# Patient Record
Sex: FEMALE | Race: WHITE | Employment: OTHER | URBAN - METROPOLITAN AREA
[De-identification: names, ages, dates, MRNs, and addresses within clinical notes are randomized per-mention and may not be internally consistent; named-entity substitution may affect disease eponyms.]

---

## 2017-11-14 PROBLEM — S42.022A DISPLACED FRACTURE OF SHAFT OF LEFT CLAVICLE, INITIAL ENCOUNTER FOR CLOSED FRACTURE: Status: ACTIVE | Noted: 2017-11-14

## 2018-04-16 ENCOUNTER — HOSPITAL ENCOUNTER (OUTPATIENT)
Dept: GENERAL RADIOLOGY | Age: 63
Discharge: HOME | End: 2018-04-18
Payer: MEDICARE

## 2018-04-16 ENCOUNTER — OFFICE VISIT (OUTPATIENT)
Dept: ORTHOPEDIC SURGERY | Age: 63
End: 2018-04-16
Payer: MEDICARE

## 2018-04-16 VITALS
RESPIRATION RATE: 16 BRPM | BODY MASS INDEX: 28.35 KG/M2 | SYSTOLIC BLOOD PRESSURE: 140 MMHG | HEART RATE: 55 BPM | WEIGHT: 160 LBS | DIASTOLIC BLOOD PRESSURE: 67 MMHG | HEIGHT: 63 IN

## 2018-04-16 DIAGNOSIS — S42.022A DISPLACED FRACTURE OF SHAFT OF LEFT CLAVICLE, INITIAL ENCOUNTER FOR CLOSED FRACTURE: ICD-10-CM

## 2018-04-16 DIAGNOSIS — S42.022A DISPLACED FRACTURE OF SHAFT OF LEFT CLAVICLE, INITIAL ENCOUNTER FOR CLOSED FRACTURE: Primary | ICD-10-CM

## 2018-04-16 PROCEDURE — 3017F COLORECTAL CA SCREEN DOC REV: CPT | Performed by: NURSE PRACTITIONER

## 2018-04-16 PROCEDURE — 3014F SCREEN MAMMO DOC REV: CPT | Performed by: NURSE PRACTITIONER

## 2018-04-16 PROCEDURE — 99212 OFFICE O/P EST SF 10 MIN: CPT | Performed by: NURSE PRACTITIONER

## 2018-04-16 PROCEDURE — G8427 DOCREV CUR MEDS BY ELIG CLIN: HCPCS | Performed by: NURSE PRACTITIONER

## 2018-04-16 PROCEDURE — 99212 OFFICE O/P EST SF 10 MIN: CPT

## 2018-04-16 PROCEDURE — 73000 X-RAY EXAM OF COLLAR BONE: CPT

## 2018-04-16 PROCEDURE — G8417 CALC BMI ABV UP PARAM F/U: HCPCS | Performed by: NURSE PRACTITIONER

## 2018-04-16 PROCEDURE — 1036F TOBACCO NON-USER: CPT | Performed by: NURSE PRACTITIONER

## 2018-08-15 ENCOUNTER — HOSPITAL ENCOUNTER (OUTPATIENT)
Dept: INFUSION THERAPY | Age: 63
Setting detail: INFUSION SERIES
Discharge: HOME | End: 2018-08-15
Payer: MEDICARE

## 2018-08-15 VITALS
HEIGHT: 62 IN | SYSTOLIC BLOOD PRESSURE: 155 MMHG | RESPIRATION RATE: 16 BRPM | BODY MASS INDEX: 29.44 KG/M2 | TEMPERATURE: 98.2 F | HEART RATE: 67 BPM | OXYGEN SATURATION: 99 % | DIASTOLIC BLOOD PRESSURE: 67 MMHG | WEIGHT: 160 LBS

## 2018-08-15 DIAGNOSIS — M81.0 OSTEOPOROSIS, UNSPECIFIED OSTEOPOROSIS TYPE, UNSPECIFIED PATHOLOGICAL FRACTURE PRESENCE: ICD-10-CM

## 2018-08-15 PROCEDURE — 96372 THER/PROPH/DIAG INJ SC/IM: CPT

## 2018-08-15 PROCEDURE — 6360000002 HC RX W HCPCS: Performed by: FAMILY MEDICINE

## 2018-08-15 RX ADMIN — DENOSUMAB 60 MG: 60 INJECTION SUBCUTANEOUS at 14:21

## 2018-08-15 NOTE — PROGRESS NOTES
Patient tolerated prolia injection well. Therapy plan reviewed with patient. Verbalizes understanding. Reviewed AVS with patient, reviewed medication information, verbalizes good knowledge of current plan, and has no signs or symptoms to report at this time. Declines copy of AVS.  Next appointment made and patient instructed on lab draw and procedure for next injection.

## 2019-02-15 ENCOUNTER — HOSPITAL ENCOUNTER (OUTPATIENT)
Dept: INFUSION THERAPY | Age: 64
Setting detail: INFUSION SERIES
Discharge: HOME | End: 2019-02-15
Payer: MEDICARE

## 2019-02-15 VITALS
OXYGEN SATURATION: 100 % | HEART RATE: 67 BPM | WEIGHT: 160 LBS | BODY MASS INDEX: 29.44 KG/M2 | DIASTOLIC BLOOD PRESSURE: 73 MMHG | RESPIRATION RATE: 16 BRPM | SYSTOLIC BLOOD PRESSURE: 166 MMHG | TEMPERATURE: 98 F | HEIGHT: 62 IN

## 2019-02-15 DIAGNOSIS — M81.0 OSTEOPOROSIS, UNSPECIFIED OSTEOPOROSIS TYPE, UNSPECIFIED PATHOLOGICAL FRACTURE PRESENCE: ICD-10-CM

## 2019-02-15 PROCEDURE — 6360000002 HC RX W HCPCS: Performed by: FAMILY MEDICINE

## 2019-02-15 PROCEDURE — 96372 THER/PROPH/DIAG INJ SC/IM: CPT

## 2019-02-15 RX ADMIN — DENOSUMAB 60 MG: 60 INJECTION SUBCUTANEOUS at 13:43

## 2019-02-24 ENCOUNTER — HOSPITAL ENCOUNTER (EMERGENCY)
Age: 64
Discharge: HOME | End: 2019-02-24
Payer: MEDICARE

## 2019-02-24 ENCOUNTER — APPOINTMENT (OUTPATIENT)
Dept: CT IMAGING | Age: 64
End: 2019-02-24
Payer: MEDICARE

## 2019-02-24 VITALS
HEIGHT: 62 IN | SYSTOLIC BLOOD PRESSURE: 176 MMHG | WEIGHT: 160 LBS | HEART RATE: 70 BPM | DIASTOLIC BLOOD PRESSURE: 76 MMHG | RESPIRATION RATE: 16 BRPM | BODY MASS INDEX: 29.44 KG/M2 | OXYGEN SATURATION: 95 % | TEMPERATURE: 98.2 F

## 2019-02-24 DIAGNOSIS — S01.01XA LACERATION OF SCALP, INITIAL ENCOUNTER: Primary | ICD-10-CM

## 2019-02-24 PROCEDURE — 70450 CT HEAD/BRAIN W/O DYE: CPT

## 2019-02-24 PROCEDURE — 12002 RPR S/N/AX/GEN/TRNK2.6-7.5CM: CPT

## 2019-02-24 PROCEDURE — 6370000000 HC RX 637 (ALT 250 FOR IP): Performed by: NURSE PRACTITIONER

## 2019-02-24 PROCEDURE — 90471 IMMUNIZATION ADMIN: CPT | Performed by: NURSE PRACTITIONER

## 2019-02-24 PROCEDURE — 6360000002 HC RX W HCPCS: Performed by: NURSE PRACTITIONER

## 2019-02-24 PROCEDURE — 99283 EMERGENCY DEPT VISIT LOW MDM: CPT

## 2019-02-24 PROCEDURE — 2500000003 HC RX 250 WO HCPCS: Performed by: NURSE PRACTITIONER

## 2019-02-24 PROCEDURE — 90715 TDAP VACCINE 7 YRS/> IM: CPT | Performed by: NURSE PRACTITIONER

## 2019-02-24 RX ORDER — LIDOCAINE HYDROCHLORIDE AND EPINEPHRINE 10; 10 MG/ML; UG/ML
20 INJECTION, SOLUTION INFILTRATION; PERINEURAL ONCE
Status: COMPLETED | OUTPATIENT
Start: 2019-02-24 | End: 2019-02-24

## 2019-02-24 RX ORDER — CEPHALEXIN 500 MG/1
500 CAPSULE ORAL 3 TIMES DAILY
Qty: 21 CAPSULE | Refills: 0 | Status: SHIPPED | OUTPATIENT
Start: 2019-02-24 | End: 2019-03-03

## 2019-02-24 RX ORDER — BACITRACIN ZINC AND POLYMYXIN B SULFATE 500; 1000 [USP'U]/G; [USP'U]/G
OINTMENT TOPICAL
Qty: 30 G | Refills: 0 | Status: SHIPPED | OUTPATIENT
Start: 2019-02-24 | End: 2019-03-03

## 2019-02-24 RX ORDER — GINSENG 100 MG
CAPSULE ORAL ONCE
Status: COMPLETED | OUTPATIENT
Start: 2019-02-24 | End: 2019-02-24

## 2019-02-24 RX ADMIN — LIDOCAINE HYDROCHLORIDE AND EPINEPHRINE 20 ML: 10; 10 INJECTION, SOLUTION INFILTRATION; PERINEURAL at 17:02

## 2019-02-24 RX ADMIN — BACITRACIN: 500 OINTMENT TOPICAL at 17:02

## 2019-02-24 RX ADMIN — TETANUS TOXOID, REDUCED DIPHTHERIA TOXOID AND ACELLULAR PERTUSSIS VACCINE, ADSORBED 0.5 ML: 5; 2.5; 8; 8; 2.5 SUSPENSION INTRAMUSCULAR at 17:00

## 2019-02-24 ASSESSMENT — PAIN SCALES - WONG BAKER: WONGBAKER_NUMERICALRESPONSE: 2

## 2019-02-24 ASSESSMENT — PAIN SCALES - GENERAL
PAINLEVEL_OUTOF10: 5
PAINLEVEL_OUTOF10: 6

## 2019-02-24 ASSESSMENT — PAIN DESCRIPTION - PAIN TYPE: TYPE: ACUTE PAIN

## 2019-02-24 ASSESSMENT — PAIN DESCRIPTION - DESCRIPTORS: DESCRIPTORS: ACHING

## 2019-02-24 ASSESSMENT — PAIN DESCRIPTION - LOCATION: LOCATION: HEAD

## 2019-08-16 ENCOUNTER — HOSPITAL ENCOUNTER (OUTPATIENT)
Age: 64
Discharge: HOME | End: 2019-08-16
Payer: MEDICARE

## 2019-08-16 ENCOUNTER — HOSPITAL ENCOUNTER (OUTPATIENT)
Dept: INFUSION THERAPY | Age: 64
Setting detail: INFUSION SERIES
Discharge: HOME | End: 2019-08-16
Payer: MEDICARE

## 2019-08-16 VITALS
TEMPERATURE: 98.1 F | RESPIRATION RATE: 16 BRPM | WEIGHT: 160 LBS | HEART RATE: 58 BPM | BODY MASS INDEX: 29.26 KG/M2 | DIASTOLIC BLOOD PRESSURE: 72 MMHG | SYSTOLIC BLOOD PRESSURE: 158 MMHG

## 2019-08-16 DIAGNOSIS — M81.0 OSTEOPOROSIS, UNSPECIFIED OSTEOPOROSIS TYPE, UNSPECIFIED PATHOLOGICAL FRACTURE PRESENCE: Primary | ICD-10-CM

## 2019-08-16 LAB
ANION GAP SERPL CALCULATED.3IONS-SCNC: 8 MMOL/L (ref 7–16)
BUN BLDV-MCNC: 11 MG/DL (ref 8–23)
CALCIUM SERPL-MCNC: 10.1 MG/DL (ref 8.6–10.2)
CHLORIDE BLD-SCNC: 102 MMOL/L (ref 98–107)
CO2: 30 MMOL/L (ref 22–29)
CREAT SERPL-MCNC: 0.8 MG/DL (ref 0.5–1)
GFR AFRICAN AMERICAN: >60
GFR NON-AFRICAN AMERICAN: >60 ML/MIN/1.73
GLUCOSE BLD-MCNC: 69 MG/DL (ref 74–99)
POTASSIUM SERPL-SCNC: 5.7 MMOL/L (ref 3.5–5)
SODIUM BLD-SCNC: 140 MMOL/L (ref 132–146)

## 2019-08-16 PROCEDURE — 80048 BASIC METABOLIC PNL TOTAL CA: CPT

## 2019-08-16 PROCEDURE — 36415 COLL VENOUS BLD VENIPUNCTURE: CPT

## 2019-08-16 PROCEDURE — 6360000002 HC RX W HCPCS: Performed by: FAMILY MEDICINE

## 2019-08-16 PROCEDURE — 96372 THER/PROPH/DIAG INJ SC/IM: CPT

## 2019-08-16 RX ADMIN — DENOSUMAB 60 MG: 60 INJECTION SUBCUTANEOUS at 13:52

## 2020-02-18 ENCOUNTER — HOSPITAL ENCOUNTER (OUTPATIENT)
Dept: INFUSION THERAPY | Age: 65
Setting detail: INFUSION SERIES
Discharge: HOME | End: 2020-02-18
Payer: MEDICARE

## 2020-02-18 ENCOUNTER — HOSPITAL ENCOUNTER (OUTPATIENT)
Age: 65
Discharge: HOME | End: 2020-02-18
Payer: MEDICARE

## 2020-02-18 DIAGNOSIS — M81.0 OSTEOPOROSIS, UNSPECIFIED OSTEOPOROSIS TYPE, UNSPECIFIED PATHOLOGICAL FRACTURE PRESENCE: Primary | ICD-10-CM

## 2020-02-18 LAB — CALCIUM SERPL-MCNC: 10.1 MG/DL (ref 8.6–10.2)

## 2020-02-18 PROCEDURE — 82310 ASSAY OF CALCIUM: CPT

## 2020-02-18 PROCEDURE — 36415 COLL VENOUS BLD VENIPUNCTURE: CPT

## 2020-02-18 PROCEDURE — 96372 THER/PROPH/DIAG INJ SC/IM: CPT

## 2020-02-18 PROCEDURE — 6360000002 HC RX W HCPCS: Performed by: FAMILY MEDICINE

## 2020-02-18 RX ADMIN — DENOSUMAB 60 MG: 60 INJECTION SUBCUTANEOUS at 14:54

## 2020-02-22 ENCOUNTER — APPOINTMENT (OUTPATIENT)
Dept: GENERAL RADIOLOGY | Age: 65
End: 2020-02-22
Payer: MEDICARE

## 2020-02-22 ENCOUNTER — HOSPITAL ENCOUNTER (EMERGENCY)
Age: 65
Discharge: HOME | End: 2020-02-23
Attending: EMERGENCY MEDICINE
Payer: MEDICARE

## 2020-02-22 LAB
ACETAMINOPHEN LEVEL: <5 MCG/ML (ref 10–30)
ALBUMIN SERPL-MCNC: 5 G/DL (ref 3.5–5.2)
ALP BLD-CCNC: 85 U/L (ref 35–104)
ALT SERPL-CCNC: 21 U/L (ref 0–32)
AMPHETAMINE SCREEN, URINE: NOT DETECTED
ANION GAP SERPL CALCULATED.3IONS-SCNC: 14 MMOL/L (ref 7–16)
AST SERPL-CCNC: 29 U/L (ref 0–31)
BACTERIA: ABNORMAL /HPF
BARBITURATE SCREEN URINE: NOT DETECTED
BENZODIAZEPINE SCREEN, URINE: NOT DETECTED
BILIRUB SERPL-MCNC: 0.3 MG/DL (ref 0–1.2)
BILIRUBIN URINE: NEGATIVE
BLOOD, URINE: NEGATIVE
BUN BLDV-MCNC: 17 MG/DL (ref 8–23)
CALCIUM SERPL-MCNC: 10.7 MG/DL (ref 8.6–10.2)
CANNABINOID SCREEN URINE: NOT DETECTED
CHLORIDE BLD-SCNC: 102 MMOL/L (ref 98–107)
CLARITY: CLEAR
CO2: 27 MMOL/L (ref 22–29)
COCAINE METABOLITE SCREEN URINE: NOT DETECTED
COLOR: YELLOW
CREAT SERPL-MCNC: 0.9 MG/DL (ref 0.5–1)
ETHANOL: <10 MG/DL (ref 0–0.08)
FENTANYL SCREEN, URINE: NOT DETECTED
GFR AFRICAN AMERICAN: >60
GFR NON-AFRICAN AMERICAN: >60 ML/MIN/1.73
GLUCOSE BLD-MCNC: 102 MG/DL (ref 74–99)
GLUCOSE URINE: NEGATIVE MG/DL
HCT VFR BLD CALC: 44.7 % (ref 34–48)
HEMOGLOBIN: 14.4 G/DL (ref 11.5–15.5)
KETONES, URINE: NEGATIVE MG/DL
LEUKOCYTE ESTERASE, URINE: ABNORMAL
LIPASE: 37 U/L (ref 13–60)
Lab: NORMAL
MCH RBC QN AUTO: 30.3 PG (ref 26–35)
MCHC RBC AUTO-ENTMCNC: 32.2 % (ref 32–34.5)
MCV RBC AUTO: 94.1 FL (ref 80–99.9)
METHADONE SCREEN, URINE: NOT DETECTED
NITRITE, URINE: NEGATIVE
OPIATE SCREEN URINE: NOT DETECTED
OXYCODONE URINE: NOT DETECTED
PDW BLD-RTO: 12.1 FL (ref 11.5–15)
PH UA: 5.5 (ref 5–9)
PHENCYCLIDINE SCREEN URINE: NOT DETECTED
PLATELET # BLD: 239 E9/L (ref 130–450)
PMV BLD AUTO: 10.4 FL (ref 7–12)
POTASSIUM SERPL-SCNC: 4.4 MMOL/L (ref 3.5–5)
PROTEIN UA: NEGATIVE MG/DL
RBC # BLD: 4.75 E12/L (ref 3.5–5.5)
RBC UA: ABNORMAL /HPF (ref 0–2)
SALICYLATE, SERUM: <0.3 MG/DL (ref 0–30)
SODIUM BLD-SCNC: 143 MMOL/L (ref 132–146)
SPECIFIC GRAVITY UA: 1.02 (ref 1–1.03)
TOTAL PROTEIN: 8.1 G/DL (ref 6.4–8.3)
TRICYCLIC ANTIDEPRESSANTS SCREEN SERUM: NEGATIVE NG/ML
TROPONIN: <0.01 NG/ML (ref 0–0.03)
UROBILINOGEN, URINE: 0.2 E.U./DL
WBC # BLD: 7.7 E9/L (ref 4.5–11.5)
WBC UA: ABNORMAL /HPF (ref 0–5)

## 2020-02-22 PROCEDURE — 81001 URINALYSIS AUTO W/SCOPE: CPT

## 2020-02-22 PROCEDURE — 93005 ELECTROCARDIOGRAM TRACING: CPT | Performed by: EMERGENCY MEDICINE

## 2020-02-22 PROCEDURE — 71045 X-RAY EXAM CHEST 1 VIEW: CPT

## 2020-02-22 PROCEDURE — 99285 EMERGENCY DEPT VISIT HI MDM: CPT

## 2020-02-22 PROCEDURE — 85027 COMPLETE CBC AUTOMATED: CPT

## 2020-02-22 PROCEDURE — 36415 COLL VENOUS BLD VENIPUNCTURE: CPT

## 2020-02-22 PROCEDURE — 80307 DRUG TEST PRSMV CHEM ANLYZR: CPT

## 2020-02-22 PROCEDURE — 84484 ASSAY OF TROPONIN QUANT: CPT

## 2020-02-22 PROCEDURE — 80053 COMPREHEN METABOLIC PANEL: CPT

## 2020-02-22 PROCEDURE — 83690 ASSAY OF LIPASE: CPT

## 2020-02-22 PROCEDURE — G0480 DRUG TEST DEF 1-7 CLASSES: HCPCS

## 2020-02-22 ASSESSMENT — ENCOUNTER SYMPTOMS
ALLERGIC/IMMUNOLOGIC NEGATIVE: 1
CHEST TIGHTNESS: 0
EYES NEGATIVE: 1

## 2020-02-23 ENCOUNTER — HOSPITAL ENCOUNTER (INPATIENT)
Age: 65
LOS: 8 days | Discharge: NURSING, REHAB OR LONG-TERM CARE | DRG: 881 | End: 2020-03-03
Attending: EMERGENCY MEDICINE | Admitting: PSYCHIATRY & NEUROLOGY
Payer: MEDICARE

## 2020-02-23 VITALS
SYSTOLIC BLOOD PRESSURE: 136 MMHG | OXYGEN SATURATION: 100 % | RESPIRATION RATE: 17 BRPM | BODY MASS INDEX: 29.44 KG/M2 | HEIGHT: 62 IN | DIASTOLIC BLOOD PRESSURE: 76 MMHG | HEART RATE: 88 BPM | TEMPERATURE: 98.3 F | WEIGHT: 160 LBS

## 2020-02-23 LAB
ACETAMINOPHEN LEVEL: <5 MCG/ML (ref 10–30)
ALBUMIN SERPL-MCNC: 4.7 G/DL (ref 3.5–5.2)
ALP BLD-CCNC: 85 U/L (ref 35–104)
ALT SERPL-CCNC: 18 U/L (ref 0–32)
AMPHETAMINE SCREEN, URINE: NOT DETECTED
ANION GAP SERPL CALCULATED.3IONS-SCNC: 11 MMOL/L (ref 7–16)
AST SERPL-CCNC: 22 U/L (ref 0–31)
BARBITURATE SCREEN URINE: NOT DETECTED
BENZODIAZEPINE SCREEN, URINE: NOT DETECTED
BILIRUB SERPL-MCNC: 0.3 MG/DL (ref 0–1.2)
BUN BLDV-MCNC: 24 MG/DL (ref 8–23)
CALCIUM SERPL-MCNC: 10.2 MG/DL (ref 8.6–10.2)
CANNABINOID SCREEN URINE: NOT DETECTED
CHLORIDE BLD-SCNC: 102 MMOL/L (ref 98–107)
CO2: 27 MMOL/L (ref 22–29)
COCAINE METABOLITE SCREEN URINE: NOT DETECTED
CREAT SERPL-MCNC: 0.9 MG/DL (ref 0.5–1)
EKG ATRIAL RATE: 78 BPM
EKG P AXIS: 67 DEGREES
EKG P-R INTERVAL: 130 MS
EKG Q-T INTERVAL: 378 MS
EKG QRS DURATION: 72 MS
EKG QTC CALCULATION (BAZETT): 430 MS
EKG R AXIS: 57 DEGREES
EKG T AXIS: 60 DEGREES
EKG VENTRICULAR RATE: 78 BPM
ETHANOL: <10 MG/DL (ref 0–0.08)
FENTANYL SCREEN, URINE: NOT DETECTED
GFR AFRICAN AMERICAN: >60
GFR NON-AFRICAN AMERICAN: >60 ML/MIN/1.73
GLUCOSE BLD-MCNC: 86 MG/DL (ref 74–99)
HCT VFR BLD CALC: 41.4 % (ref 34–48)
HEMOGLOBIN: 13.1 G/DL (ref 11.5–15.5)
Lab: NORMAL
MCH RBC QN AUTO: 29.8 PG (ref 26–35)
MCHC RBC AUTO-ENTMCNC: 31.6 % (ref 32–34.5)
MCV RBC AUTO: 94.3 FL (ref 80–99.9)
METHADONE SCREEN, URINE: NOT DETECTED
OPIATE SCREEN URINE: NOT DETECTED
OXYCODONE URINE: NOT DETECTED
PDW BLD-RTO: 12.3 FL (ref 11.5–15)
PHENCYCLIDINE SCREEN URINE: NOT DETECTED
PLATELET # BLD: 204 E9/L (ref 130–450)
PMV BLD AUTO: 10.6 FL (ref 7–12)
POTASSIUM SERPL-SCNC: 3.9 MMOL/L (ref 3.5–5)
RBC # BLD: 4.39 E12/L (ref 3.5–5.5)
SALICYLATE, SERUM: <0.3 MG/DL (ref 0–30)
SODIUM BLD-SCNC: 140 MMOL/L (ref 132–146)
TOTAL PROTEIN: 7.5 G/DL (ref 6.4–8.3)
TRICYCLIC ANTIDEPRESSANTS SCREEN SERUM: NEGATIVE NG/ML
WBC # BLD: 8.8 E9/L (ref 4.5–11.5)

## 2020-02-23 PROCEDURE — G0480 DRUG TEST DEF 1-7 CLASSES: HCPCS

## 2020-02-23 PROCEDURE — 36415 COLL VENOUS BLD VENIPUNCTURE: CPT

## 2020-02-23 PROCEDURE — 80053 COMPREHEN METABOLIC PANEL: CPT

## 2020-02-23 PROCEDURE — 80307 DRUG TEST PRSMV CHEM ANLYZR: CPT

## 2020-02-23 PROCEDURE — 93010 ELECTROCARDIOGRAM REPORT: CPT | Performed by: INTERNAL MEDICINE

## 2020-02-23 PROCEDURE — 99285 EMERGENCY DEPT VISIT HI MDM: CPT

## 2020-02-23 PROCEDURE — 85027 COMPLETE CBC AUTOMATED: CPT
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
## 2020-02-23 NOTE — ED NOTES
Patient arrives to [facility] OF [facility] and cooperative. She is well groomed. Patient denies SI, HI and AVH. She is alert and oriented x3.       Alexandre Calzada RN  02/22/20 2023

## 2020-02-23 NOTE — ED PROVIDER NOTES
examination    This patient has remained hemodynamically stable during their ED course. Diagnosis:  1. Mental health problem        Disposition:  Patient's disposition: Admit to mental health unit - medically cleared for admission  Patient's condition is stable.               Rhonda Archer DO  02/23/20 5557

## 2020-02-23 NOTE — ED NOTES
CALL TO PT'S BROTHER KAROL. LEFT MESSAGE FOR HIM TO CALL BACK.        [facility]  02/22/20 2040
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
## 2020-02-23 NOTE — ED NOTES
Pt resting comfortably, 100% of breakfast tray eaten.  No needs at this time     Benedict Castillo, CINDA  02/23/20 4028

## 2020-02-23 NOTE — ED NOTES
ELIE placed phone call to patient brother Chaitanya Barbosa [phone], per Hannah Franklin he has to call his wife first before pt can return home. Chino to call GELACIO back.      Theodora Sykes, MSW, LSW  02/23/20 1143

## 2020-02-24 PROBLEM — F32.9 MDD (MAJOR DEPRESSIVE DISORDER), SINGLE EPISODE: Status: ACTIVE | Noted: 2020-02-24

## 2020-02-24 PROCEDURE — 1240000000 HC EMOTIONAL WELLNESS R&B

## 2020-02-24 PROCEDURE — 6370000000 HC RX 637 (ALT 250 FOR IP): Performed by: PSYCHIATRY & NEUROLOGY

## 2020-02-24 RX ORDER — TRAZODONE HYDROCHLORIDE 50 MG/1
25 TABLET ORAL NIGHTLY PRN
Status: DISCONTINUED | OUTPATIENT
Start: 2020-02-24 | End: 2020-03-03 | Stop reason: HOSPADM

## 2020-02-24 RX ORDER — MAGNESIUM HYDROXIDE/ALUMINUM HYDROXICE/SIMETHICONE 120; 1200; 1200 MG/30ML; MG/30ML; MG/30ML
30 SUSPENSION ORAL PRN
Status: DISCONTINUED | OUTPATIENT
Start: 2020-02-24 | End: 2020-03-03 | Stop reason: HOSPADM

## 2020-02-24 RX ORDER — HALOPERIDOL 5 MG/ML
3 INJECTION INTRAMUSCULAR EVERY 6 HOURS PRN
Status: DISCONTINUED | OUTPATIENT
Start: 2020-02-24 | End: 2020-03-03 | Stop reason: HOSPADM

## 2020-02-24 RX ORDER — ACETAMINOPHEN 325 MG/1
650 TABLET ORAL EVERY 4 HOURS PRN
Status: DISCONTINUED | OUTPATIENT
Start: 2020-02-24 | End: 2020-03-03 | Stop reason: HOSPADM

## 2020-02-24 RX ORDER — HALOPERIDOL 2 MG/1
3 TABLET ORAL EVERY 6 HOURS PRN
Status: DISCONTINUED | OUTPATIENT
Start: 2020-02-24 | End: 2020-03-03 | Stop reason: HOSPADM

## 2020-02-24 RX ADMIN — ALUMINUM HYDROXIDE, MAGNESIUM HYDROXIDE, AND SIMETHICONE 30 ML: 200; 200; 20 SUSPENSION ORAL at 19:55

## 2020-02-24 ASSESSMENT — PAIN SCALES - GENERAL: PAINLEVEL_OUTOF10: 0

## 2020-02-24 ASSESSMENT — SLEEP AND FATIGUE QUESTIONNAIRES
DO YOU USE A SLEEP AID: NO
AVERAGE NUMBER OF SLEEP HOURS: 11
DO YOU HAVE DIFFICULTY SLEEPING: NO

## 2020-02-24 ASSESSMENT — LIFESTYLE VARIABLES: HISTORY_ALCOHOL_USE: NO
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
## 2020-02-24 NOTE — ED PROVIDER NOTES
trismus  Neck: Supple, full ROM, non tender to palpation in the midline, no stridor, no crepitus, no meningeal signs  Pulmonary: Lungs clear to auscultation bilaterally, no wheezes, rales, or rhonchi. Not in respiratory distress  Cardiovascular:  Regular rate. Regular rhythm. No murmurs, gallops, or rubs. 2+ distal pulses  Chest: no chest wall tenderness  Abdomen: Soft. Non tender. Non distended. +BS. No rebound, guarding, or rigidity. No pulsatile masses appreciated. Musculoskeletal: Moves all extremities x 4. Warm and well perfused, no clubbing, cyanosis, or edema. Capillary refill <3 seconds  Skin: warm and dry. No rashes. Superficial laceration to forearm  Neurologic: GCS 15, CN 2-12 grossly intact, no focal deficits, symmetric strength 5/5 in the upper and lower extremities bilaterally  Psych: Anxious appearing but not homicidal or suicidal no hallucinations    -------------------------------------------------- RESULTS -------------------------------------------------  I have personally reviewed all laboratory and imaging results for this patient. Results are listed below. LABS:  No results found for this visit on 02/23/20. RADIOLOGY:  Interpreted by Radiologist.  No orders to display             ------------------------- NURSING NOTES AND VITALS REVIEWED ---------------------------   The nursing notes within the ED encounter and vital signs as below have been reviewed by myself. BP (!) 162/85   Pulse 79   Temp 97.2 °F (36.2 °C) (Tympanic)   Resp 18   Wt 160 lb (72.6 kg)   SpO2 98%   BMI 29.26 kg/m²   Oxygen Saturation Interpretation: Normal    The patients available past medical records and past encounters were reviewed. ------------------------------ ED COURSE/MEDICAL DECISION MAKING----------------------  Medications - No data to display          Medical Decision Making:    Patient presents because of history of being unable to care for self.   Patient has some history of mental

## 2020-02-24 NOTE — ED NOTES
Patient has 2 bags of belongings in the cabinet in room [address] [facility], RN  02/23/20 0089

## 2020-02-24 NOTE — ED NOTES
discussed pt case with Amanda Blevins NP awaiting call back and decision form Dr Casper Edwards at s time.      Russell Alvarado RN  02/24/20 0356

## 2020-02-24 NOTE — ED NOTES
Pt ambulates to bathroom with no assistance, maintained steady gait, tolerated well.       Sandra Echeverria RN  02/24/20 3058

## 2020-02-25 PROBLEM — F39 MOOD DISORDER (HCC): Status: ACTIVE | Noted: 2020-02-25

## 2020-02-25 PROBLEM — F29 PSYCHOSIS (HCC): Status: ACTIVE | Noted: 2020-02-25

## 2020-02-25 PROBLEM — F79 INTELLECTUAL DISABILITY: Status: ACTIVE | Noted: 2020-02-25

## 2020-02-25 PROCEDURE — 1240000000 HC EMOTIONAL WELLNESS R&B

## 2020-02-25 PROCEDURE — 6370000000 HC RX 637 (ALT 250 FOR IP): Performed by: PSYCHIATRY & NEUROLOGY

## 2020-02-25 PROCEDURE — 6370000000 HC RX 637 (ALT 250 FOR IP): Performed by: NURSE PRACTITIONER

## 2020-02-25 PROCEDURE — 99222 1ST HOSP IP/OBS MODERATE 55: CPT | Performed by: NURSE PRACTITIONER

## 2020-02-25 RX ORDER — OXCARBAZEPINE 150 MG/1
150 TABLET, FILM COATED ORAL 2 TIMES DAILY
Status: DISCONTINUED | OUTPATIENT
Start: 2020-02-25 | End: 2020-03-03 | Stop reason: HOSPADM

## 2020-02-25 RX ORDER — OLANZAPINE 5 MG/1
5 TABLET ORAL NIGHTLY
Status: DISCONTINUED | OUTPATIENT
Start: 2020-02-25 | End: 2020-03-03 | Stop reason: HOSPADM

## 2020-02-25 RX ORDER — KETOCONAZOLE 20 MG/ML
SHAMPOO TOPICAL DAILY
COMMUNITY

## 2020-02-25 RX ORDER — DULOXETIN HYDROCHLORIDE 60 MG/1
60 CAPSULE, DELAYED RELEASE ORAL DAILY
Status: ON HOLD | COMMUNITY
End: 2020-03-03 | Stop reason: HOSPADM

## 2020-02-25 RX ORDER — CHLORAL HYDRATE 500 MG
1000 CAPSULE ORAL 3 TIMES DAILY
COMMUNITY

## 2020-02-25 RX ADMIN — OXCARBAZEPINE 150 MG: 150 TABLET, FILM COATED ORAL at 20:13

## 2020-02-25 RX ADMIN — OLANZAPINE 5 MG: 5 TABLET, FILM COATED ORAL at 20:13

## 2020-02-25 RX ADMIN — OXCARBAZEPINE 150 MG: 150 TABLET, FILM COATED ORAL at 14:45

## 2020-02-25 RX ADMIN — TRAZODONE HYDROCHLORIDE 25 MG: 50 TABLET ORAL at 20:13

## 2020-02-25 ASSESSMENT — LIFESTYLE VARIABLES: HISTORY_ALCOHOL_USE: NO

## 2020-02-25 ASSESSMENT — PAIN SCALES - GENERAL: PAINLEVEL_OUTOF10: 0

## 2020-02-25 ASSESSMENT — SLEEP AND FATIGUE QUESTIONNAIRES
DO YOU USE A SLEEP AID: NO
AVERAGE NUMBER OF SLEEP HOURS: 11
DO YOU HAVE DIFFICULTY SLEEPING: NO
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
## 2020-02-25 NOTE — PROGRESS NOTES
No  Insight and Judgment: Poor Judgment, Poor Insight  Present Suicidal Ideation: No  Present Homicidal Ideation: No    Tobacco Screening:  Practical Counseling, on admission, kelly X, if applicable and completed (first 3 are required if patient doesn't refuse):            ( )  Recognizing danger situations (included triggers and roadblocks)                    ( )  Coping skills (new ways to manage stress, exercise, relaxation techniques, changing routine, distraction)                                                           ( )  Basic information about quitting (benefits of quitting, techniques in how to quit, available resources  ( ) [facility]                                           ( ) Patient refused counseling  (x ) Patient has not smoked in the last 30 days    Metabolic Screening:    No results found for: LABA1C    No results found for: CHOL  No results found for: TRIG  No results found for: HDL  No components found for: LDLCAL  No results found for: LABVLDL      Body mass index is 29.47 kg/m². BP Readings from Last 2 Encounters:   02/24/20 (!) 149/67   02/23/20 136/76           Pt admitted with followings belongings:  Dentures: Uppers  Vision - Corrective Lenses: None  Hearing Aid: None  Jewelry: Earrings(white dianne studs)  Body Piercings Removed: N/A  Were All Patient Medications Collected?: Not Applicable     Valuables sent home with no one. Valuables placed in safe in security envelope, number:  . Patient's home medications were none brought in  Patient oriented to surroundings and program expectations and copy of patient rights given. Received admission packet:  yes. Consents reviewed, signed yes. Refused no. Patient verbalize understanding:  yes.     Patient education on precautions: yes                  Bakari Guardado RN

## 2020-02-25 NOTE — PLAN OF CARE
Problem: Anger Management/Homicidal Ideation:  Goal: Ability to verbalize frustrations and anger appropriately will improve  Description  Ability to verbalize frustrations and anger appropriately will improve  Outcome: Ongoing     Problem: Anger Management/Homicidal Ideation:  Goal: Absence of angry outbursts  Description  Absence of angry outbursts  Outcome: Ongoing

## 2020-02-25 NOTE — PROGRESS NOTES
Number of participants: 13  Type of group: Recreational  Mode of intervention: Socialization, Exploration and Activity  Topic: Pet Therapy

## 2020-02-25 NOTE — GROUP NOTE
Group Therapy Note    Date: 2/25/2020    Group Start Time: 1120  Group End Time: 1200  Group Topic: Psychotherapy    [facility]  ALEXIA Richter LSW        Group Therapy Note    Attendees: 7         Patient's Goal:   IMPROVING RELATIONSHIPS WITH OTHERS    Notes:  Patient noted her family has been mean to her. Status After Intervention:  Improved    Participation Level:  Active Listener and Interactive    Participation Quality: Appropriate, Attentive and Sharing      Speech:  normal      Thought Process/Content: Logical      Affective Functioning: Congruent      Mood: euthymic      Level of consciousness:  Alert, Oriented x4 and Attentive      Response to Learning: Able to verbalize current knowledge/experience, Able to verbalize/acknowledge new learning, Able to retain information, Capable of insight, Able to change behavior and Progressing to goal      Endings: None Reported    Modes of Intervention: Education, Support and Socialization      Discipline Responsible: /Counselor      Signature:  Richrd Mortimer, MSW, JONATHAN

## 2020-02-25 NOTE — PLAN OF CARE
Problem: Anger Management/Homicidal Ideation:  Goal: Absence of angry outbursts  Description  Absence of angry outbursts  2/25/2020 0514 by Harpal Mckenna RN  Outcome: Met This Shift     Problem: Falls - Risk of:  Goal: Absence of physical injury  Description  Absence of physical injury  2/25/2020 0514 by Harpal Mckenna RN  Outcome: Met This Shift     Problem: Anger Management/Homicidal Ideation:  Goal: Ability to verbalize frustrations and anger appropriately will improve  Description  Ability to verbalize frustrations and anger appropriately will improve  2/25/2020 1339 by Brain Borrero RN  Outcome: Ongoing  2/25/2020 0925 by Brain Borrero RN  Outcome: Ongoing     Problem: Falls - Risk of:  Goal: Will remain free from falls  Description  Will remain free from falls  2/25/2020 1339 by Brain Borrero RN  Outcome: Ongoing  2/25/2020 0925 by Brain Borrero RN  Outcome: Ongoing  2/25/2020 0514 by Harpal Mckenna RN  Outcome: Met This Kaela Levin 87 denies any SI, HI, but states she hears her brothers voices. I am not sure if she understands the question as she states her brother is retired and lives in the same house. When repeated the question several times patient is on another topic and can never complete the answer. Patient could not answer any more questions as she continually went to different topics and distracted by her room mate. Groups are offered and encouraged. Will continue to monitor.

## 2020-02-25 NOTE — GROUP NOTE
Group Therapy Note    Date: 2/25/2020    Group Start Time: 0115  Group End Time: 0200  Group Topic: Cognitive Skills    [facility]  [id number] [place] [address]        Group Therapy Note    Attendees: 10         Patient's Goal:To participate and acknowledge new information given on topic of healthy boundaries. Notes:  Pt was an active listener and had monopolized the conversation at times. Pt cooperative when redirected. Status After Intervention:  Improved    Participation Level:  Active Listener    Participation Quality: Appropriate, Attentive and Sharing      Speech:  loud      Thought Process/Content: Logical      Affective Functioning: Congruent      Mood: euthymic      Level of consciousness:  Alert, Oriented x4 and Attentive      Response to Learning: Able to verbalize current knowledge/experience, Able to verbalize/acknowledge new learning, Able to retain information and Capable of insight      Endings: None Reported    Modes of Intervention: Education, Support, Socialization and Exploration      Discipline Responsible: /Counselor      Signature:  Lindsey Funk

## 2020-02-25 NOTE — CARE COORDINATION
Fernando Ramon, [facility] office, [phone] cell contacted Navigator to report that he is assisting client's SSA, who is new with discharge planning and coordiantion of care. He reports that they may have a temporary placement for the client at [facility].  Client has been having issues at home since fall, reportedly after her psychiatrist has made medication changes. Laney Muhammad is requesting a team meeting with client, SW, her SSA,  and her potential future providers. He reports that she is her own Guardian. He requests that she sign a release for [facility] to be able to contact jose guadalupe chapman and her psychiatrist.    Obtained signature for MARVA for [facility] ([facility] She does not know who her psychiatrist is. Chart indicates that she treats with dr. Jeffie Castleman. She reports that she does not want to return home and is agreeable to working with [facility]. Faxed MARVA to [facility].    Electronically signed by Savana Peterson on 2/25/2020 at 11:01 AM
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
## 2020-02-26 PROCEDURE — 99232 SBSQ HOSP IP/OBS MODERATE 35: CPT | Performed by: NURSE PRACTITIONER

## 2020-02-26 PROCEDURE — 1240000000 HC EMOTIONAL WELLNESS R&B

## 2020-02-26 PROCEDURE — 6370000000 HC RX 637 (ALT 250 FOR IP): Performed by: NURSE PRACTITIONER

## 2020-02-26 PROCEDURE — 6370000000 HC RX 637 (ALT 250 FOR IP): Performed by: PSYCHIATRY & NEUROLOGY

## 2020-02-26 RX ADMIN — TRAZODONE HYDROCHLORIDE 25 MG: 50 TABLET ORAL at 20:13

## 2020-02-26 RX ADMIN — OLANZAPINE 5 MG: 5 TABLET, FILM COATED ORAL at 20:14

## 2020-02-26 RX ADMIN — OXCARBAZEPINE 150 MG: 150 TABLET, FILM COATED ORAL at 08:51

## 2020-02-26 RX ADMIN — OXCARBAZEPINE 150 MG: 150 TABLET, FILM COATED ORAL at 20:13

## 2020-02-26 ASSESSMENT — PAIN SCALES - GENERAL
PAINLEVEL_OUTOF10: 0
PAINLEVEL_OUTOF10: 0

## 2020-02-26 NOTE — PROGRESS NOTES
Type of Group: Psychotherapy    Wellness Binder Information  Module Name:    Session Number:     Patient's Goal:  Gain insight into interpersonal relationships by interacting  with others. Notes: Pt was able to express strong feelingngs regarding coping with difficult people .  Pt was given support and feed back,    Status After Intervention:  Improved    Participation Level: Interactive    Participation Quality: Attentive and Sharing      Speech: Normal      Thought Process/Content: Logical      Affective Functioning: Congruent      Mood: depressed      Level of consciousness:  Attentive      Response to Learning: Able to verbalize/acknowledge new learning      Endings: None Reported    Modes of Intervention: Support and Socialization      Discipline Responsible: /Counselor      Signature:  TIARA Pereira

## 2020-02-26 NOTE — PROGRESS NOTES
Out in  thoughts concrete at times childlike coloring pictures speech loud pressured but cooperative support given

## 2020-02-26 NOTE — GROUP NOTE
Group Therapy Note    Date: 2/26/2020    Group Start Time: 1000  Group End Time: 7935  Group Topic: Psychoeducation    [facility]    Maricarmen Moncada, CTRS        Group Therapy Note      Number of participants: 6  Type of group: Psychoeducation  Mode of intervention: Education, Support, Socialization, Exploration, Clarifying, and Problem-solving  Topic: Reminiscence Therapy: Childhood pets   Objective: Pt will initiate conversations with peers across group sharing childhood memories of pets. Patient's Goal:  \"Continue to be happy\"     Notes:  Pt was interactive during group sharing childhood memories of pets. Pt met objective by initiating conversation with peers across group. At times can be childlike but is pleasant and cooperative. Status After Intervention:  Improved    Participation Level:  Active Listener and Interactive    Participation Quality: Appropriate, Attentive, Sharing and Supportive      Speech:  normal and pressured      Thought Process/Content: Logical      Affective Functioning: Congruent      Mood: euthymic      Level of consciousness:  Alert, Oriented x4 and Attentive      Response to Learning: Able to verbalize current knowledge/experience, Able to verbalize/acknowledge new learning, Able to retain information, Capable of insight, Able to change behavior and Progressing to goal      Endings: None Reported    Modes of Intervention: Education, Support, Socialization, Exploration, Clarifying and Problem-solving

## 2020-02-26 NOTE — GROUP NOTE
Group Therapy Note    Date: 2/25/2020    Group Start Time: 2015  Group End Time: 2030  Group Topic: Healthy Living/Wellness    [facility]  Elicia De Jesus, RN        Group Therapy Note    Attendees: 11/22         Signature:  Christi Garcias RN

## 2020-02-26 NOTE — GROUP NOTE
Group Therapy Note    Date: 2/25/2020    Group Start Time: 2000  Group End Time: 2015  Group Topic: Wrap-Up    [facility] Savana Cantu, RN        Group Therapy Note    Attendees: 10/22       Signature:  Brennon Rand RN

## 2020-02-26 NOTE — PROGRESS NOTES
3:00-3:30    Pt attended leisure group. Enjoyed putting together 3D puzzle. Pt was 1 out of 4 in attendance.
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
## 2020-02-26 NOTE — PLAN OF CARE
Problem: Anger Management/Homicidal Ideation:  Goal: Absence of angry outbursts  Description  Absence of angry outbursts  Outcome: Met This Shift     Problem: Falls - Risk of:  Goal: Will remain free from falls  Description  Will remain free from falls  4/97/7450 [id number] by Papo Rojas RN  Outcome: Met This Shift     Problem: Falls - Risk of:  Goal: Absence of physical injury  Description  Absence of physical injury  Outcome: Met This Shift     Pt denies SI/HI/AV hallucinations. Pt is bright and pleasant. Denies any depression or anxiety. Friendly and social with peers. Cooperative with care. Will continue to monitor and offer support.

## 2020-02-27 PROCEDURE — 1240000000 HC EMOTIONAL WELLNESS R&B

## 2020-02-27 PROCEDURE — 99232 SBSQ HOSP IP/OBS MODERATE 35: CPT | Performed by: NURSE PRACTITIONER

## 2020-02-27 PROCEDURE — 6370000000 HC RX 637 (ALT 250 FOR IP): Performed by: PSYCHIATRY & NEUROLOGY

## 2020-02-27 PROCEDURE — 6370000000 HC RX 637 (ALT 250 FOR IP): Performed by: NURSE PRACTITIONER

## 2020-02-27 RX ADMIN — OXCARBAZEPINE 150 MG: 150 TABLET, FILM COATED ORAL at 20:11

## 2020-02-27 RX ADMIN — OXCARBAZEPINE 150 MG: 150 TABLET, FILM COATED ORAL at 09:36

## 2020-02-27 RX ADMIN — OLANZAPINE 5 MG: 5 TABLET, FILM COATED ORAL at 20:11

## 2020-02-27 RX ADMIN — TRAZODONE HYDROCHLORIDE 25 MG: 50 TABLET ORAL at 20:11

## 2020-02-27 ASSESSMENT — PAIN SCALES - GENERAL: PAINLEVEL_OUTOF10: 0

## 2020-02-27 NOTE — GROUP NOTE
Group Therapy Note    Date: 2/27/2020    Group Start Time: 1100  Group End Time: 1130  Group Topic: Healthy Living/Wellness    [facility]  [facility]        Group Therapy Note    Attendees:8/13             Signature:  Chuy Painting

## 2020-02-27 NOTE — PROGRESS NOTES
3:00- 4:00 pm  Attended afternoon leisure activity. Pleasant and social with peers. Was 1 of 7 in attendance.
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
## 2020-02-28 PROCEDURE — 6370000000 HC RX 637 (ALT 250 FOR IP): Performed by: PSYCHIATRY & NEUROLOGY

## 2020-02-28 PROCEDURE — 1240000000 HC EMOTIONAL WELLNESS R&B

## 2020-02-28 PROCEDURE — 99232 SBSQ HOSP IP/OBS MODERATE 35: CPT | Performed by: NURSE PRACTITIONER

## 2020-02-28 PROCEDURE — 6370000000 HC RX 637 (ALT 250 FOR IP): Performed by: NURSE PRACTITIONER

## 2020-02-28 RX ADMIN — OXCARBAZEPINE 150 MG: 150 TABLET, FILM COATED ORAL at 08:43

## 2020-02-28 RX ADMIN — OXCARBAZEPINE 150 MG: 150 TABLET, FILM COATED ORAL at 21:17

## 2020-02-28 RX ADMIN — OLANZAPINE 5 MG: 5 TABLET, FILM COATED ORAL at 21:17

## 2020-02-28 RX ADMIN — TRAZODONE HYDROCHLORIDE 25 MG: 50 TABLET ORAL at 21:17

## 2020-02-28 ASSESSMENT — PAIN SCALES - GENERAL: PAINLEVEL_OUTOF10: 0
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
## 2020-02-28 NOTE — PROGRESS NOTES
Never Smoker    Smokeless tobacco: Never Used   Substance and Sexual Activity    Alcohol use: Not Currently     Alcohol/week: 0.0 standard drinks    Drug use: No    Sexual activity: Never   Lifestyle    Physical activity:     Days per week: Not on file     Minutes per session: Not on file    Stress: Not on file   Relationships    Social connections:     Talks on phone: Not on file     Gets together: Not on file     Attends Nondenominational service: Not on file     Active member of club or organization: Not on file     Attends meetings of clubs or organizations: Not on file     Relationship status: Not on file    Intimate partner violence:     Fear of current or ex partner: Not on file     Emotionally abused: Not on file     Physically abused: Not on file     Forced sexual activity: Not on file   Other Topics Concern    Not on file   Social History Narrative    Not on file           ROS:  [x] All negative/unchanged except if checked.  Explain positive(checked items) below:  [] Constitutional  [] Eyes  [] Ear/Nose/Mouth/Throat  [] Respiratory  [] CV  [] GI  []   [] Musculoskeletal  [] Skin/Breast  [] Neurological  [] Endocrine  [] Heme/Lymph  [] Allergic/Immunologic    Explanation:     MEDICATIONS:    Current Facility-Administered Medications:     OLANZapine (ZYPREXA) tablet 5 mg, 5 mg, Oral, Nightly, Jarad Memos Dellick, APRN - CNP, 5 mg at 02/27/20 2011    OXcarbazepine (TRILEPTAL) tablet 150 mg, 150 mg, Oral, BID, Jarad Memos Dellick, APRN - CNP, 099 mg at 02/28/20 0843    acetaminophen (TYLENOL) tablet 650 mg, 650 mg, Oral, Q4H PRN, Jesus Manuel Fenton MD    haloperidol lactate (HALDOL) injection 3 mg, 3 mg, Intramuscular, Q6H PRN **OR** haloperidol (HALDOL) tablet 3 mg, 3 mg, Oral, Q6H PRN, Jesus Manuel Fenton MD    traZODone (DESYREL) tablet 25 mg, 25 mg, Oral, Nightly PRN, Jesus Manuel Fenton MD, 25 mg at 02/27/20 2011    magnesium hydroxide (MILK OF MAGNESIA) 400 MG/5ML suspension 30 mL, 30 mL, Oral, Daily PRN,
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
## 2020-02-28 NOTE — CARE COORDINATION
Tameka Skinner  The case manger agreed to meeting  02/28/19 @ 2:30. Tameka Skinner reports a representative from [facility] will meet with pt  at this time and explain the rules and program to  Patient and answer any questions that pt may have.

## 2020-02-29 PROCEDURE — 99232 SBSQ HOSP IP/OBS MODERATE 35: CPT | Performed by: NURSE PRACTITIONER

## 2020-02-29 PROCEDURE — 1240000000 HC EMOTIONAL WELLNESS R&B

## 2020-02-29 PROCEDURE — 6370000000 HC RX 637 (ALT 250 FOR IP): Performed by: NURSE PRACTITIONER

## 2020-02-29 PROCEDURE — 6370000000 HC RX 637 (ALT 250 FOR IP): Performed by: PSYCHIATRY & NEUROLOGY

## 2020-02-29 RX ADMIN — TRAZODONE HYDROCHLORIDE 25 MG: 50 TABLET ORAL at 21:04

## 2020-02-29 RX ADMIN — OXCARBAZEPINE 150 MG: 150 TABLET, FILM COATED ORAL at 09:04

## 2020-02-29 RX ADMIN — OXCARBAZEPINE 150 MG: 150 TABLET, FILM COATED ORAL at 21:04

## 2020-02-29 RX ADMIN — OLANZAPINE 5 MG: 5 TABLET, FILM COATED ORAL at 21:04

## 2020-02-29 RX ADMIN — ACETAMINOPHEN 650 MG: 325 TABLET ORAL at 21:03

## 2020-02-29 ASSESSMENT — PAIN SCALES - GENERAL
PAINLEVEL_OUTOF10: 8
PAINLEVEL_OUTOF10: 0

## 2020-02-29 NOTE — PROGRESS NOTES
BEHAVIORAL HEALTH FOLLOW-UP NOTE     2/29/2020     Patient was seen and examined in person, Chart reviewed   Patient's case discussed with staff/team    Chief Complaint: Calm pleasant and cooperative    Interim History: Met with the patient while on the unit today the patient is very social with others. The patient denies all symptoms of suicide, homicide, audible and visual hallucinations. The patient is very pleasant, cooperative. She is slightly intrusive with poor boundaries due to her mental disability. The patient is taking her medications as prescribed and has not been an emotional outburst on the unit. The patient states that she feels sorrow for fighting with her brother.       Appetite:   [x] Normal/Unchanged  [] Increased  [] Decreased      Sleep:       [x] Normal/Unchanged  [] Fair       [] Poor              Energy:    [x] Normal/Unchanged  [] Increased  [] Decreased        SI [] Present  [x] Absent    HI  []Present  [x] Absent     Aggression:  [] yes  [x] no    Patient is [x] able  [] unable to CONTRACT FOR SAFETY     PAST MEDICAL/PSYCHIATRIC HISTORY:   Past Medical History:   Diagnosis Date    Anemia     Clavicle fracture     Helicobacter pylori infection 12/15    Mental disability     Osteopenia     gets yearly injections    Pneumonia     Thyroid disease     Varicose veins        FAMILY/SOCIAL HISTORY:  Family History   Problem Relation Age of Onset    Diabetes Mother     Heart Failure Mother      Social History     Socioeconomic History    Marital status: Single     Spouse name: Not on file    Number of children: 0    Years of education: 15    Highest education level: Not on file   Occupational History    Not on file   Social Needs    Financial resource strain: Not on file    Food insecurity:     Worry: Not on file     Inability: Not on file    Transportation needs:     Medical: Not on file     Non-medical: Not on file   Tobacco Use    Smoking status: Never Smoker    Smokeless tobacco: Never Used   Substance and Sexual Activity    Alcohol use: Not Currently     Alcohol/week: 0.0 standard drinks    Drug use: No    Sexual activity: Never   Lifestyle    Physical activity:     Days per week: Not on file     Minutes per session: Not on file    Stress: Not on file   Relationships    Social connections:     Talks on phone: Not on file     Gets together: Not on file     Attends Sabianist service: Not on file     Active member of club or organization: Not on file     Attends meetings of clubs or organizations: Not on file     Relationship status: Not on file    Intimate partner violence:     Fear of current or ex partner: Not on file     Emotionally abused: Not on file     Physically abused: Not on file     Forced sexual activity: Not on file   Other Topics Concern    Not on file   Social History Narrative    Not on file           ROS:  [x] All negative/unchanged except if checked.  Explain positive(checked items) below:  [] Constitutional  [] Eyes  [] Ear/Nose/Mouth/Throat  [] Respiratory  [] CV  [] GI  []   [] Musculoskeletal  [] Skin/Breast  [] Neurological  [] Endocrine  [] Heme/Lymph  [] Allergic/Immunologic    Explanation:     MEDICATIONS:    Current Facility-Administered Medications:     OLANZapine (ZYPREXA) tablet 5 mg, 5 mg, Oral, Nightly, Carlie Chacon, APRN - CNP, 5 mg at 02/28/20 2117    OXcarbazepine (TRILEPTAL) tablet 150 mg, 150 mg, Oral, BID, Carlie Chacon, APRN - CNP, 424 mg at 02/29/20 0904    acetaminophen (TYLENOL) tablet 650 mg, 650 mg, Oral, Q4H PRN, Keiko Ng MD    haloperidol lactate (HALDOL) injection 3 mg, 3 mg, Intramuscular, Q6H PRN **OR** haloperidol (HALDOL) tablet 3 mg, 3 mg, Oral, Q6H PRN, Keiko Ng MD    traZODone (DESYREL) tablet 25 mg, 25 mg, Oral, Nightly PRN, Keiko Ng MD, 25 mg at 02/28/20 2117    magnesium hydroxide (MILK OF MAGNESIA) 400 MG/5ML suspension 30 mL, 30 mL, Oral, Daily PRN, Keiko Ng MD    aluminum & magnesium hydroxide-simethicone (MAALOX) 200-200-20 MG/5ML suspension 30 mL, 30 mL, Oral, PRN, Emma Solares MD, 30 mL at 02/1955      Examination:  BP (!) 119/57   Pulse 56   Temp 97.9 °F (36.6 °C) (Temporal)   Resp 12   Ht 5' 2\" (1.575 m)   Wt 161 lb 2 oz (73.1 kg)   SpO2 100%   BMI 29.47 kg/m²   Gait - steady  Medication side effects(SE): Medication side effects were explained to the patient patient stated understanding. Patient exhibits no side effects or complications from currently prescribed medications. Mental Status Examination:    Level of consciousness:  within normal limits   Appearance:  good grooming and good hygiene  Behavior/Motor:  no abnormalities noted  Attitude toward examiner:  cooperative  Speech: Normal rate rhythm volume and tone  Mood: euthymic  Affect:  mood congruent  Thought processes:  coherent   Thought content: Without hallucinations delusions or paranoia  Cognition:  oriented to person, place, and time but the patient is deemed MR  Concentration intact  Insight fair   Judgement fair     ASSESSMENT:   Patient symptoms are:  [] Well controlled  [x] Improving  [] Worsening  [] No change      Diagnosis:   Active Problems:    Psychosis NOS ([facility] [address]    Intellectual disability    Mood disorder ([facility] [address]  Resolved Problems:    * No resolved hospital problems. *      LABS:    No results for input(s): WBC, HGB, PLT in the last 72 hours. No results for input(s): NA, K, CL, CO2, BUN, CREATININE, GLUCOSE in the last 72 hours. No results for input(s): BILITOT, ALKPHOS, AST, ALT in the last 72 hours.   Lab Results   Component Value Date    LABAMPH NOT DETECTED 02/23/2020    BARBSCNU NOT DETECTED 02/23/2020    LABBENZ NOT DETECTED 02/23/2020    LABMETH NOT DETECTED 02/23/2020    OPIATESCREENURINE NOT DETECTED 02/23/2020    PHENCYCLIDINESCREENURINE NOT DETECTED 02/23/2020    ETOH <10 02/23/2020     No results found for: TSH, FREET4  No results found for: LITHIUM  No

## 2020-02-29 NOTE — PLAN OF CARE
Problem: Anger Management/Homicidal Ideation:  Goal: Ability to verbalize frustrations and anger appropriately will improve  Description  Ability to verbalize frustrations and anger appropriately will improve  2/28/2020 2051 by Brijesh Sifuentes RN  Outcome: Ongoing     Patient out on unit. Social with peers. Reports having a good day and a \"good meeting\". Reports an understanding of keeping anger in control and \"not hitting\" anymore. Patient presents with a bright affect. Poor boundaries. Calm and cooperative during conversation. Denies SI, HI, and hallucinations at this time. Purposeful rounding continued.

## 2020-02-29 NOTE — PLAN OF CARE
Pt denies SI HI and hallucinations. Pt is bright, pleasant, cooperative. Less intrusive/better with boundaries today. Pt is social with staff and others. Out on the unit working on puzzles/coloring/watching tv at times with others. Pt is medication compliant. Pt is eating provided meals. We will continue to provide support and comfort for the patient.      Problem: Anger Management/Homicidal Ideation:  Goal: Ability to verbalize frustrations and anger appropriately will improve  Description  Ability to verbalize frustrations and anger appropriately will improve  Outcome: Met This Shift     Problem: Anger Management/Homicidal Ideation:  Goal: Absence of angry outbursts  Description  Absence of angry outbursts  Outcome: Met This Shift     Problem: Falls - Risk of:  Goal: Will remain free from falls  Description  Will remain free from falls  Outcome: Met This Shift

## 2020-02-29 NOTE — GROUP NOTE
Group Therapy Note    Date: 2/29/2020    Group Start Time: 8898  Group End Time: 1700  Group Topic: Recreational    [facility]    Viky Shaw RN    Pt attended and participated in afternoon game of [id number].     Group Therapy Note    Attendees: 7/14         Signature:  Mini Belle RN

## 2020-03-01 PROCEDURE — 6370000000 HC RX 637 (ALT 250 FOR IP): Performed by: NURSE PRACTITIONER

## 2020-03-01 PROCEDURE — 1240000000 HC EMOTIONAL WELLNESS R&B

## 2020-03-01 PROCEDURE — 6370000000 HC RX 637 (ALT 250 FOR IP): Performed by: PSYCHIATRY & NEUROLOGY

## 2020-03-01 PROCEDURE — 99232 SBSQ HOSP IP/OBS MODERATE 35: CPT | Performed by: NURSE PRACTITIONER

## 2020-03-01 RX ADMIN — OXCARBAZEPINE 150 MG: 150 TABLET, FILM COATED ORAL at 20:47

## 2020-03-01 RX ADMIN — ACETAMINOPHEN 650 MG: 325 TABLET ORAL at 20:47

## 2020-03-01 RX ADMIN — TRAZODONE HYDROCHLORIDE 25 MG: 50 TABLET ORAL at 20:47

## 2020-03-01 RX ADMIN — OXCARBAZEPINE 150 MG: 150 TABLET, FILM COATED ORAL at 09:17

## 2020-03-01 RX ADMIN — OLANZAPINE 5 MG: 5 TABLET, FILM COATED ORAL at 20:48

## 2020-03-01 ASSESSMENT — PAIN SCALES - GENERAL
PAINLEVEL_OUTOF10: 0
PAINLEVEL_OUTOF10: 0
PAINLEVEL_OUTOF10: 6
PAINLEVEL_OUTOF10: 0

## 2020-03-01 NOTE — GROUP NOTE
Group Therapy Note    Date: 3/1/2020    Group Start Time: 1100  Group End Time: 6444  Group Topic: Healthy Living/Wellness    [facility]  Kasi Pavon        Group Therapy Note    Attendees:        Signature:  Addie Yusuf

## 2020-03-01 NOTE — PROGRESS NOTES
Patient took a shower tonight. Less intrusive and \" understands boundaries now the groups and therapy are helpful. \" \"I like everyone here. \" Patient denies SI/HI denies A/V hallucinations. Taking meds going to group. Out on unit watching tv and social with staff and peers.

## 2020-03-01 NOTE — BH NOTE
[facility] Interdisciplinary Treatment Plan Note     Review Date & Time:   0800  03/01/2020    Patient was not in treatment team.    Admission Type:   Admission Type: Involuntary    Reason for admission:  Reason for Admission: \"I had a fight with my sister-in-law and my brother called the  on me\"    Estimated Length of Stay Update:  3-5 days   Estimated Discharge Date Update: 03/06/2020    PATIENT STRENGTHS:  Patient Strengths:Strengths: Communication, Motivated, Social Skills  Patient Strengths and Limitations:Limitations: Difficult relationships / poor social skills  Addictive Behavior:Addictive Behavior  In the past 3 months, have you felt or has someone told you that you have a problem with:  : None  Do you have a history of Chemical Use?: No  Do you have a history of Alcohol Use?: No  Do you have a history of Street Drug Abuse?: No  Histroy of Prescripton Drug Abuse?: No  Medical Problems:   Past Medical History:   Diagnosis Date    Anemia     Clavicle fracture     Helicobacter pylori infection 12/15    Mental disability     Osteopenia     gets yearly injections    Pneumonia     Thyroid disease     Varicose veins        Risk:  Fall RiskTotal: 67  Walker Scale Walker Scale Score: 22  BVC Total: 0  Change in scores none. Changes to plan of Care no.     Status EXAM:   Status and Exam  Normal: Yes  Facial Expression: Brightened  Affect: Congruent  Level of Consciousness: Alert  Mood:Normal: No  Mood: Anxious  Motor Activity:Normal: Yes  Motor Activity: Other(See Comments)(wdl)  Interview Behavior: Cooperative  Preception: [place] to Person, Loman Alpha to Time, [place] to Place, [place] to Situation  Attention:Normal: No  Attention: Distractible  Thought Processes: Circumstantial  Thought Content:Normal: No  Thought Content: Preoccupations  Hallucinations: None  Delusions: No  Memory:Normal: No  Memory: Poor Recent  Insight and Judgment: No  Insight and Judgment: Poor Judgment, Poor Insight  Present Suicidal Ideation: No  Present Homicidal Ideation: No    Daily Assessment Last Entry:   Daily Sleep (WDL): Within Defined Limits         Patient Currently in Pain: Denies  Daily Nutrition (WDL): Within Defined Limits    Patient Monitoring:  Frequency of Checks: 4 times per hour, close    Psychiatric Symptoms:   Depression Symptoms  Depression Symptoms: Impaired concentration  Anxiety Symptoms  Anxiety Symptoms: Generalized  Karla Symptoms  Karla Symptoms: Flight of ideas     Psychosis Symptoms  Delusion Type: No problems reported or observed. Suicide Risk CSSR-S:  1) Within the past month, have you wished you were dead or wished you could go to sleep and not wake up? : No  2) Have you actually had any thoughts of killing yourself? : No  6) Have you ever done anything, started to do anything, or prepared to do anything to end your life?: No  Change in Result none. Change in Plan of care no. EDUCATION:   Learner Progress Toward Treatment Goals: Reviewed results and recommendations of this team, Reviewed group plan and strategies and Reviewed goals and plan of care    Method: Small group    Outcome: Verbalized understanding and Needs reinforcement    PATIENT GOALS: \"Do a puzzle together\"    PLAN/TREATMENT RECOMMENDATIONS UPDATE:  Provide support to patient and encourage group attendance and participation.     GOALS UPDATE:  Time frame for Short-Term Goals: 1-2 days      Eileen Richard RN

## 2020-03-01 NOTE — PROGRESS NOTES
Attended afternoon psycho-education group. Shared goal for the day as to do a puzzle with others. Patient 1 of 8 in attendance. Pscyhoeducation group topic was healthy vs unhealthy relationships and ways to foster healthy relationships. Group was facilitated from [phone].

## 2020-03-01 NOTE — PROGRESS NOTES
BEHAVIORAL HEALTH FOLLOW-UP NOTE     3/1/2020     Patient was seen and examined in person, Chart reviewed   Patient's case discussed with staff/team    Chief Complaint: Calm pleasant and cooperative    Interim History: I met with the patient while on the unit today. The patient is calm pleasant and cooperative with me. The patient is social and interacting with others in an appropriate manner. The patient denies suicidal homicidal ideation. The patient denies audible visual hallucinations. The patient is without emotional outburst or disturbance. The patient is taking her medication as prescribed and going to groups.       Appetite:   [x] Normal/Unchanged  [] Increased  [] Decreased      Sleep:       [x] Normal/Unchanged  [] Fair       [] Poor              Energy:    [x] Normal/Unchanged  [] Increased  [] Decreased        SI [] Present  [x] Absent    HI  []Present  [x] Absent     Aggression:  [] yes  [x] no    Patient is [x] able  [] unable to CONTRACT FOR SAFETY     PAST MEDICAL/PSYCHIATRIC HISTORY:   Past Medical History:   Diagnosis Date    Anemia     Clavicle fracture     Helicobacter pylori infection 12/15    Mental disability     Osteopenia     gets yearly injections    Pneumonia     Thyroid disease     Varicose veins        FAMILY/SOCIAL HISTORY:  Family History   Problem Relation Age of Onset    Diabetes Mother     Heart Failure Mother      Social History     Socioeconomic History    Marital status: Single     Spouse name: Not on file    Number of children: 0    Years of education: 15    Highest education level: Not on file   Occupational History    Not on file   Social Needs    Financial resource strain: Not on file    Food insecurity:     Worry: Not on file     Inability: Not on file    Transportation needs:     Medical: Not on file     Non-medical: Not on file   Tobacco Use    Smoking status: Never Smoker    Smokeless tobacco: Never Used   Substance and Sexual Activity    Alcohol use: Not Currently     Alcohol/week: 0.0 standard drinks    Drug use: No    Sexual activity: Never   Lifestyle    Physical activity:     Days per week: Not on file     Minutes per session: Not on file    Stress: Not on file   Relationships    Social connections:     Talks on phone: Not on file     Gets together: Not on file     Attends Zoroastrian service: Not on file     Active member of club or organization: Not on file     Attends meetings of clubs or organizations: Not on file     Relationship status: Not on file    Intimate partner violence:     Fear of current or ex partner: Not on file     Emotionally abused: Not on file     Physically abused: Not on file     Forced sexual activity: Not on file   Other Topics Concern    Not on file   Social History Narrative    Not on file           ROS:  [x] All negative/unchanged except if checked.  Explain positive(checked items) below:  [] Constitutional  [] Eyes  [] Ear/Nose/Mouth/Throat  [] Respiratory  [] CV  [] GI  []   [] Musculoskeletal  [] Skin/Breast  [] Neurological  [] Endocrine  [] Heme/Lymph  [] Allergic/Immunologic    Explanation:     MEDICATIONS:    Current Facility-Administered Medications:     OLANZapine (ZYPREXA) tablet 5 mg, 5 mg, Oral, Nightly, MATT Perla CNP, 5 mg at 02/29/20 2104    OXcarbazepine (TRILEPTAL) tablet 150 mg, 150 mg, Oral, BID, MATT Perla CNP, 928 mg at 03/01/20 0917    acetaminophen (TYLENOL) tablet 650 mg, 650 mg, Oral, Q4H PRN, Sonia Cavazos MD, 650 mg at 02/29/20 2103    haloperidol lactate (HALDOL) injection 3 mg, 3 mg, Intramuscular, Q6H PRN **OR** haloperidol (HALDOL) tablet 3 mg, 3 mg, Oral, Q6H PRN, Sonia Cavazos MD    traZODone (DESYREL) tablet 25 mg, 25 mg, Oral, Nightly PRN, Sonia Cavazos MD, 25 mg at 02/29/20 2104    magnesium hydroxide (MILK OF MAGNESIA) 400 MG/5ML suspension 30 mL, 30 mL, Oral, Daily PRN, Sonia Cavazos MD    aluminum & magnesium hydroxide-simethicone (MAALOX) 761-883-90 MG/5ML suspension 30 mL, 30 mL, Oral, PRN, Arden Tijerina MD, 30 mL at 02/1955      Examination:  /65   Pulse 81   Temp 96.4 °F (35.8 °C) (Temporal)   Resp 16   Ht 5' 2\" (1.575 m)   Wt 161 lb 2 oz (73.1 kg)   SpO2 100%   BMI 29.47 kg/m²   Gait - steady  Medication side effects(SE): Medication side effects were explained to the patient patient stated understanding. Patient exhibits no side effects or complications from currently prescribed medications. Mental Status Examination:    Level of consciousness:  within normal limits   Appearance:  good grooming and good hygiene  Behavior/Motor:  no abnormalities noted  Attitude toward examiner:  cooperative  Speech: Normal rate rhythm volume and tone  Mood: euthymic  Affect:  mood congruent  Thought processes:  coherent   Thought content: Without hallucinations delusions or paranoia  Cognition:  oriented to person, place, and time but the patient is deemed MR  Concentration intact  Insight fair   Judgement fair     ASSESSMENT:   Patient symptoms are:  [] Well controlled  [x] Improving  [] Worsening  [] No change      Diagnosis:   Active Problems:    Psychosis NOS ([facility] [address]    Intellectual disability    Mood disorder ([facility] [address]  Resolved Problems:    * No resolved hospital problems. *      LABS:    No results for input(s): WBC, HGB, PLT in the last 72 hours. No results for input(s): NA, K, CL, CO2, BUN, CREATININE, GLUCOSE in the last 72 hours. No results for input(s): BILITOT, ALKPHOS, AST, ALT in the last 72 hours.   Lab Results   Component Value Date    LABAMPH NOT DETECTED 02/23/2020    BARBSCNU NOT DETECTED 02/23/2020    LABBENZ NOT DETECTED 02/23/2020    LABMETH NOT DETECTED 02/23/2020    OPIATESCREENURINE NOT DETECTED 02/23/2020    PHENCYCLIDINESCREENURINE NOT DETECTED 02/23/2020    ETOH <10 02/23/2020     No results found for: TSH, FREET4  No results found for: LITHIUM  No results found for: VALPROATE,

## 2020-03-02 PROCEDURE — 6370000000 HC RX 637 (ALT 250 FOR IP): Performed by: NURSE PRACTITIONER

## 2020-03-02 PROCEDURE — 1240000000 HC EMOTIONAL WELLNESS R&B

## 2020-03-02 PROCEDURE — 6370000000 HC RX 637 (ALT 250 FOR IP): Performed by: PSYCHIATRY & NEUROLOGY

## 2020-03-02 PROCEDURE — 99232 SBSQ HOSP IP/OBS MODERATE 35: CPT | Performed by: NURSE PRACTITIONER

## 2020-03-02 RX ADMIN — OLANZAPINE 5 MG: 5 TABLET, FILM COATED ORAL at 20:48

## 2020-03-02 RX ADMIN — TRAZODONE HYDROCHLORIDE 25 MG: 50 TABLET ORAL at 20:47

## 2020-03-02 RX ADMIN — OXCARBAZEPINE 150 MG: 150 TABLET, FILM COATED ORAL at 20:48

## 2020-03-02 RX ADMIN — OXCARBAZEPINE 150 MG: 150 TABLET, FILM COATED ORAL at 09:18

## 2020-03-02 RX ADMIN — ACETAMINOPHEN 650 MG: 325 TABLET ORAL at 20:48

## 2020-03-02 ASSESSMENT — PAIN SCALES - GENERAL
PAINLEVEL_OUTOF10: 0
PAINLEVEL_OUTOF10: 6
PAINLEVEL_OUTOF10: 0

## 2020-03-02 NOTE — PROGRESS NOTES
Camille Mcgowan MD, 30 mL at 02/1955      Examination:  BP (!) 131/55   Pulse 57   Temp 95.6 °F (35.3 °C)   Resp 16   Ht 5' 2\" (1.575 m)   Wt 161 lb 2 oz (73.1 kg)   SpO2 99%   BMI 29.47 kg/m²   Gait - steady  Medication side effects(SE): Medication side effects were explained to the patient patient stated understanding. Patient exhibits no side effects or complications from currently prescribed medications. Mental Status Examination:    Level of consciousness:  within normal limits   Appearance:  good grooming and good hygiene  Behavior/Motor:  no abnormalities noted  Attitude toward examiner:  cooperative  Speech: Normal rate rhythm volume and tone  Mood: euthymic  Affect:  mood congruent  Thought processes:  coherent   Thought content: Without hallucinations delusions or paranoia  Cognition:  oriented to person, place, and time but the patient is deemed MR  Concentration intact  Insight fair   Judgement fair     ASSESSMENT:   Patient symptoms are:  [] Well controlled  [x] Improving  [] Worsening  [] No change      Diagnosis:   Principal Problem:    Mood disorder ([facility]  Active Problems:    Psychosis NOS ([facility]    Intellectual disability  Resolved Problems:    * No resolved hospital problems. *      LABS:    No results for input(s): WBC, HGB, PLT in the last 72 hours. No results for input(s): NA, K, CL, CO2, BUN, CREATININE, GLUCOSE in the last 72 hours. No results for input(s): BILITOT, ALKPHOS, AST, ALT in the last 72 hours.   Lab Results   Component Value Date    LABAMPH NOT DETECTED 02/23/2020    BARBSCNU NOT DETECTED 02/23/2020    LABBENZ NOT DETECTED 02/23/2020    LABMETH NOT DETECTED 02/23/2020    OPIATESCREENURINE NOT DETECTED 02/23/2020    PHENCYCLIDINESCREENURINE NOT DETECTED 02/23/2020    ETOH <10 02/23/2020     No results found for: TSH, FREET4  No results found for: LITHIUM  No results found for: VALPROATE, CBMZ    RISK ASSESSMENT: Low risk for suicide or self-harm    Treatment

## 2020-03-02 NOTE — PROGRESS NOTES
Group Therapy Note    Date: 3/2/2020  Start Time: 1:45  End Time:  2:30  Number of Participants: 10    Type of Group: Psychotherapy    Wellness Binder Information  Module Name:    Session Number:    Patient's Goal: Gain inisght into nterpersonal relationships by interacting  with others. Notes:  Pt was able to verbalize unrealistic expectation and how to become more rational in their thought patterns. Pt was given feedback and support from the  Group    Status After Intervention:  Improved     Participation Level:  Active Listener    Participation Quality: Attentive and Sharing      Speech: Normal      Thought Process/Content: Logical      Affective Functioning: Congruent      Mood: anxious and depressed      Level of consciousness:  Oriented x4 and Attentive      Response to Learning: Able to verbalize/acknowledge new learning      Endings: None Reported    Modes of Intervention: Support and Socialization      Discipline Responsible: /Counselor      Signature:  TIARA Soler

## 2020-03-02 NOTE — GROUP NOTE
Group Therapy Note    Date: 3/1/2020    Group Start Time: 1930  Group End Time: 2100  Group Topic: Relaxation    [facility]  [address], RN        Group Therapy Note    Attendees: 8/13             Signature:  Caridad Tristan RN

## 2020-03-03 VITALS
BODY MASS INDEX: 29.65 KG/M2 | OXYGEN SATURATION: 99 % | SYSTOLIC BLOOD PRESSURE: 106 MMHG | HEART RATE: 61 BPM | DIASTOLIC BLOOD PRESSURE: 57 MMHG | RESPIRATION RATE: 16 BRPM | TEMPERATURE: 97.8 F | HEIGHT: 62 IN | WEIGHT: 161.13 LBS

## 2020-03-03 PROCEDURE — 99239 HOSP IP/OBS DSCHRG MGMT >30: CPT | Performed by: NURSE PRACTITIONER

## 2020-03-03 PROCEDURE — 6370000000 HC RX 637 (ALT 250 FOR IP): Performed by: NURSE PRACTITIONER

## 2020-03-03 RX ORDER — OXCARBAZEPINE 150 MG/1
150 TABLET, FILM COATED ORAL 2 TIMES DAILY
Qty: 28 TABLET | Refills: 0 | Status: SHIPPED | OUTPATIENT
Start: 2020-03-03 | End: 2022-10-27

## 2020-03-03 RX ORDER — OLANZAPINE 5 MG/1
5 TABLET ORAL NIGHTLY
Qty: 14 TABLET | Refills: 0 | Status: SHIPPED | OUTPATIENT
Start: 2020-03-03 | End: 2022-10-27

## 2020-03-03 RX ADMIN — OXCARBAZEPINE 150 MG: 150 TABLET, FILM COATED ORAL at 09:27

## 2020-03-03 ASSESSMENT — PAIN SCALES - GENERAL
PAINLEVEL_OUTOF10: 0
PAINLEVEL_OUTOF10: 0

## 2020-03-03 NOTE — GROUP NOTE
Group Therapy Note    Date: 3/2/2020    Group Start Time: 1930  Group End Time: 2030  Group Topic: Relaxation    [facility] [address]        Group Therapy Note    Attendees: 8/13           Signature:  Janene Savage

## 2020-03-03 NOTE — DISCHARGE SUMMARY
Attends meetings of clubs or organizations: Not on file     Relationship status: Not on file    Intimate partner violence:     Fear of current or ex partner: Not on file     Emotionally abused: Not on file     Physically abused: Not on file     Forced sexual activity: Not on file   Other Topics Concern    Not on file   Social History Narrative    Not on file       MEDICATIONS:    Current Facility-Administered Medications:     OLANZapine (ZYPREXA) tablet 5 mg, 5 mg, Oral, Nightly, Ling Chacon APRN - CNP, 5 mg at 03/02/20 2048    OXcarbazepine (TRILEPTAL) tablet 150 mg, 150 mg, Oral, BID, Ling Chacon APRN - CNP, 371 mg at 03/03/20 0254    acetaminophen (TYLENOL) tablet 650 mg, 650 mg, Oral, Q4H PRN, Inna Vaughn MD, 650 mg at 03/02/20 2048    haloperidol lactate (HALDOL) injection 3 mg, 3 mg, Intramuscular, Q6H PRN **OR** haloperidol (HALDOL) tablet 3 mg, 3 mg, Oral, Q6H PRN, Inna Vaughn MD    traZODone (DESYREL) tablet 25 mg, 25 mg, Oral, Nightly PRN, Inna Vaughn MD, 25 mg at 03/02/20 2047    magnesium hydroxide (MILK OF MAGNESIA) 400 MG/5ML suspension 30 mL, 30 mL, Oral, Daily PRN, Inna Vaughn MD    aluminum & magnesium hydroxide-simethicone (MAALOX) 200-200-20 MG/5ML suspension 30 mL, 30 mL, Oral, PRN, Inna Vaughn MD, 30 mL at 02/1955    Current Outpatient Medications:     OXcarbazepine (TRILEPTAL) 150 MG tablet, Take 1 tablet by mouth 2 times daily for 14 days, Disp: 28 tablet, Rfl: 0    OLANZapine (ZYPREXA) 5 MG tablet, Take 1 tablet by mouth nightly for 14 days, Disp: 14 tablet, Rfl: 0    ketoconazole (NIZORAL) 2 % shampoo, Apply topically daily Apply topically daily as needed. , Disp: , Rfl:     Omega-3 Fatty Acids (FISH OIL) 1000 MG CAPS, Take 1,000 mg by mouth 3 times daily, Disp: , Rfl:     levothyroxine (SYNTHROID) 25 MCG tablet, 25 mcg Daily 2 tabs daily, Disp: , Rfl:     denosumab (PROLIA) 60 MG/ML SOLN SC injection, Inject 60 mg into the skin as: TRILEPTAL  Take 1 tablet by mouth 2 times daily for 14 days        CONTINUE taking these medications    denosumab 60 MG/ML Soln SC injection  Commonly known as:  PROLIA     fish oil 1000 MG Caps     JOBST KNEE HIGH COMPRESSION SM Misc  Compression stockings 20-30 mm hg thigh high bilaterally  Dx :  Venous Insufficiency, Swelling  Please provide scooby     ketoconazole 2 % shampoo  Commonly known as:  NIZORAL     levothyroxine 25 MCG tablet  Commonly known as:  SYNTHROID     therapeutic multivitamin-minerals tablet     vitamin D 50 MCG (2000 UT) Caps capsule        STOP taking these medications    DULoxetine 60 MG extended release capsule  Commonly known as:  CYMBALTA           Where to Get Your Medications      These medications were sent to [facility], [address], [address]    Phone:  [phone]   · OLANZapine 5 MG tablet  · OXcarbazepine 150 MG tablet           TIME SPEND - 35 MINUTES TO COMPLETE THE EVALUATION, DISCHARGE SUMMARY, MEDICATION RECONCILIATION AND FOLLOW UP CARE     Signed:  Denae Coughlin  3/3/2020  2:57 PM

## 2020-03-03 NOTE — PLAN OF CARE
Problem: Anger Management/Homicidal Ideation:  Goal: Absence of angry outbursts  Description  Absence of angry outbursts  Outcome: Met This Shift     Problem: Falls - Risk of:  Goal: Absence of physical injury  Description  Absence of physical injury  Outcome: Met This Shift     Problem: Pain:  Goal: Pain level will decrease  Description  Pain level will decrease  Outcome: Met This Shift     Patient denies SI/HI/Hallucinations. Patient appears bright, out on the unit social with peers and staff. Patient pleasant, calm and cooperative during conversation. Patient taking prescribed medications, eating provided meals, and attending groups.

## 2020-03-03 NOTE — CARE COORDINATION
spoke with [address] [place] [address] Belen Antonio) Aureliano Roberson  [phone]  Reporst he informed pt brother of the new group home.,, ([facility])  Early Ates went and picked up pt belongings and took them to [facility], Early Ates report pt brother was impressed with [facility].     Early Ates report pt do longer follows up with  Dr. Luigi Moran as the office cancelled due to numerous missed appointments     spoke with Pearle Nageotte who report  She will schedule pt  psy follow up after she admitted

## 2020-07-27 ENCOUNTER — HOSPITAL ENCOUNTER (OUTPATIENT)
Age: 65
Discharge: HOME | End: 2020-07-29
Payer: MEDICARE

## 2020-07-27 PROCEDURE — 88175 CYTOPATH C/V AUTO FLUID REDO: CPT

## 2020-07-28 PROCEDURE — 87624 HPV HI-RISK TYP POOLED RSLT: CPT

## 2020-07-30 LAB
HPV SAMPLE: NORMAL
HPV TYPE 16: NOT DETECTED
HPV TYPE 18: NOT DETECTED
HPV, HIGH RISK OTHER: NOT DETECTED
INTERPRETATION: NORMAL
SOURCE: NORMAL

## 2020-08-11 ENCOUNTER — HOSPITAL ENCOUNTER (OUTPATIENT)
Age: 65
Discharge: HOME | End: 2020-08-11
Payer: MEDICARE

## 2020-08-11 LAB — CALCIUM SERPL-MCNC: 9.6 MG/DL (ref 8.6–10.2)

## 2020-08-11 PROCEDURE — 82310 ASSAY OF CALCIUM: CPT

## 2020-08-11 PROCEDURE — 36415 COLL VENOUS BLD VENIPUNCTURE: CPT

## 2020-08-18 ENCOUNTER — HOSPITAL ENCOUNTER (OUTPATIENT)
Dept: INFUSION THERAPY | Age: 65
Setting detail: INFUSION SERIES
Discharge: HOME | End: 2020-08-18
Payer: MEDICARE

## 2020-08-18 VITALS
RESPIRATION RATE: 16 BRPM | TEMPERATURE: 98 F | HEART RATE: 69 BPM | DIASTOLIC BLOOD PRESSURE: 57 MMHG | SYSTOLIC BLOOD PRESSURE: 129 MMHG | OXYGEN SATURATION: 98 %

## 2020-08-18 DIAGNOSIS — M81.0 OSTEOPOROSIS, UNSPECIFIED OSTEOPOROSIS TYPE, UNSPECIFIED PATHOLOGICAL FRACTURE PRESENCE: Primary | ICD-10-CM

## 2020-08-18 PROCEDURE — 6360000002 HC RX W HCPCS: Performed by: FAMILY MEDICINE

## 2020-08-18 PROCEDURE — 96372 THER/PROPH/DIAG INJ SC/IM: CPT

## 2020-08-18 RX ADMIN — DENOSUMAB 60 MG: 60 INJECTION SUBCUTANEOUS at 14:14

## 2020-08-18 ASSESSMENT — PAIN SCALES - GENERAL: PAINLEVEL_OUTOF10: 1

## 2020-10-24 ENCOUNTER — HOSPITAL ENCOUNTER (OUTPATIENT)
Age: 65
Discharge: HOME | End: 2020-10-24
Payer: MEDICARE

## 2020-10-24 LAB
ALBUMIN SERPL-MCNC: 4.4 G/DL (ref 3.5–5.2)
ALP BLD-CCNC: 56 U/L (ref 35–104)
ALT SERPL-CCNC: 11 U/L (ref 0–32)
ANION GAP SERPL CALCULATED.3IONS-SCNC: 8 MMOL/L (ref 7–16)
AST SERPL-CCNC: 17 U/L (ref 0–31)
BASOPHILS ABSOLUTE: 0.03 E9/L (ref 0–0.2)
BASOPHILS RELATIVE PERCENT: 0.6 % (ref 0–2)
BILIRUB SERPL-MCNC: 0.4 MG/DL (ref 0–1.2)
BUN BLDV-MCNC: 10 MG/DL (ref 8–23)
CALCIUM SERPL-MCNC: 9.7 MG/DL (ref 8.6–10.2)
CHLORIDE BLD-SCNC: 103 MMOL/L (ref 98–107)
CHOLESTEROL, FASTING: 250 MG/DL (ref 0–199)
CO2: 27 MMOL/L (ref 22–29)
CREAT SERPL-MCNC: 0.9 MG/DL (ref 0.5–1)
EOSINOPHILS ABSOLUTE: 0.01 E9/L (ref 0.05–0.5)
EOSINOPHILS RELATIVE PERCENT: 0.2 % (ref 0–6)
GFR AFRICAN AMERICAN: >60
GFR NON-AFRICAN AMERICAN: >60 ML/MIN/1.73
GLUCOSE BLD-MCNC: 94 MG/DL (ref 74–99)
HCT VFR BLD CALC: 41.9 % (ref 34–48)
HDLC SERPL-MCNC: 117 MG/DL
HEMOGLOBIN: 13.9 G/DL (ref 11.5–15.5)
IMMATURE GRANULOCYTES #: 0.02 E9/L
IMMATURE GRANULOCYTES %: 0.4 % (ref 0–5)
LDL CHOLESTEROL CALCULATED: 122 MG/DL (ref 0–99)
LYMPHOCYTES ABSOLUTE: 1.27 E9/L (ref 1.5–4)
LYMPHOCYTES RELATIVE PERCENT: 27.1 % (ref 20–42)
MCH RBC QN AUTO: 30.5 PG (ref 26–35)
MCHC RBC AUTO-ENTMCNC: 33.2 % (ref 32–34.5)
MCV RBC AUTO: 92.1 FL (ref 80–99.9)
MONOCYTES ABSOLUTE: 0.42 E9/L (ref 0.1–0.95)
MONOCYTES RELATIVE PERCENT: 9 % (ref 2–12)
NEUTROPHILS ABSOLUTE: 2.93 E9/L (ref 1.8–7.3)
NEUTROPHILS RELATIVE PERCENT: 62.7 % (ref 43–80)
PDW BLD-RTO: 12.1 FL (ref 11.5–15)
PLATELET # BLD: 267 E9/L (ref 130–450)
PMV BLD AUTO: 9.4 FL (ref 7–12)
POTASSIUM SERPL-SCNC: 4.4 MMOL/L (ref 3.5–5)
RBC # BLD: 4.55 E12/L (ref 3.5–5.5)
SODIUM BLD-SCNC: 138 MMOL/L (ref 132–146)
TOTAL PROTEIN: 7.5 G/DL (ref 6.4–8.3)
TRIGLYCERIDE, FASTING: 55 MG/DL (ref 0–149)
TSH SERPL DL<=0.05 MIU/L-ACNC: 2.11 UIU/ML (ref 0.27–4.2)
VLDLC SERPL CALC-MCNC: 11 MG/DL
WBC # BLD: 4.7 E9/L (ref 4.5–11.5)

## 2020-10-24 PROCEDURE — 80061 LIPID PANEL: CPT

## 2020-10-24 PROCEDURE — 80053 COMPREHEN METABOLIC PANEL: CPT

## 2020-10-24 PROCEDURE — 84443 ASSAY THYROID STIM HORMONE: CPT

## 2020-10-24 PROCEDURE — 85025 COMPLETE CBC W/AUTO DIFF WBC: CPT

## 2020-10-24 PROCEDURE — 36415 COLL VENOUS BLD VENIPUNCTURE: CPT

## 2021-01-14 LAB — SARS-COV-2: NORMAL

## 2021-02-02 ENCOUNTER — TELEPHONE (OUTPATIENT)
Dept: PRIMARY CARE CLINIC | Age: 66
End: 2021-02-02

## 2021-02-02 NOTE — TELEPHONE ENCOUNTER
[facility] and they stated the family told them you were going to be taking over as PCP. I dont see any upcoming appts or previous appt. If you are okay with this they need a prolia order and order for the bw before prolia shot. Fax to [phone].

## 2021-02-03 NOTE — PROGRESS NOTES
Spoke with Ade Nolasco today about Malcolm Roberto needing an appt with Dr Aliya Yee. He stated that he would like me to call [facility] and speak with them. I called and spoke with Berkley Solorzano. She stated that she has a script from Dr Wilbert Oseguera for her to have her calcium level drawn. I informed her that the pt brother stated that she is a patient with dr Aliya Yee now however Berkley Solorzano states that Malcolm Roberto did see Dr Reba Crain in march of 2020 and he follows her at [address] [facility],[address]. I told Berkley Solorzano that I would call [facility] office and inquire about him ordering the Prolia injection.  [facility] office stated that it would be good for her to come in since she hasnt been there since last march but they would be ok ordering the prolia script. I told them that Berkley Solorzano would be calling and make Tia's appt. I then called back again and spoke with  Berkley Solorzano and she stated she would take care of making the appt for Malcolm Roberto. I then called Ade Nolasco back and informed him of all this and he stated that was fine to have her see Dr Chelsea Esparza( he stated he kind of forgot that he follows her at the group home).

## 2021-02-04 ENCOUNTER — HOSPITAL ENCOUNTER (OUTPATIENT)
Age: 66
Discharge: HOME | End: 2021-02-04
Payer: MEDICARE

## 2021-02-04 LAB — CALCIUM SERPL-MCNC: 9 MG/DL (ref 8.6–10.2)

## 2021-02-04 PROCEDURE — 82310 ASSAY OF CALCIUM: CPT

## 2021-02-04 PROCEDURE — 36415 COLL VENOUS BLD VENIPUNCTURE: CPT

## 2021-02-17 DIAGNOSIS — M81.0 SENILE OSTEOPOROSIS: ICD-10-CM

## 2021-02-18 ENCOUNTER — HOSPITAL ENCOUNTER (OUTPATIENT)
Dept: INFUSION THERAPY | Age: 66
Setting detail: INFUSION SERIES
Discharge: HOME | End: 2021-02-18
Payer: MEDICARE

## 2021-02-18 VITALS
SYSTOLIC BLOOD PRESSURE: 142 MMHG | HEIGHT: 62 IN | HEART RATE: 84 BPM | TEMPERATURE: 98.4 F | DIASTOLIC BLOOD PRESSURE: 69 MMHG | OXYGEN SATURATION: 92 % | WEIGHT: 160 LBS | RESPIRATION RATE: 16 BRPM | BODY MASS INDEX: 29.44 KG/M2

## 2021-02-18 DIAGNOSIS — M81.0 SENILE OSTEOPOROSIS: Primary | ICD-10-CM

## 2021-02-18 PROCEDURE — 6360000002 HC RX W HCPCS: Performed by: INTERNAL MEDICINE

## 2021-02-18 PROCEDURE — 96372 THER/PROPH/DIAG INJ SC/IM: CPT

## 2021-02-18 RX ORDER — ACETAMINOPHEN 160 MG
1 TABLET,DISINTEGRATING ORAL DAILY
COMMUNITY

## 2021-02-18 RX ADMIN — DENOSUMAB 60 MG: 60 INJECTION SUBCUTANEOUS at 14:17

## 2021-02-18 ASSESSMENT — PAIN SCALES - GENERAL: PAINLEVEL_OUTOF10: 0

## 2021-02-18 NOTE — PROGRESS NOTES
Patient tolerated prilia injection well. Therapy plan reviewed with patient. Verbalizes understanding. Reviewed AVS with patient, reviewed medication information, verbalizes good knowledge of current plan, and has no signs or symptoms to report at this time. Declines copy of AVS.  Next appointment made and patient instructed on lab draw and procedure for next injection.

## 2021-04-05 LAB
ALBUMIN SERPL-MCNC: NORMAL G/DL
ALP BLD-CCNC: NORMAL U/L
ALT SERPL-CCNC: NORMAL U/L
ANION GAP SERPL CALCULATED.3IONS-SCNC: NORMAL MMOL/L
AST SERPL-CCNC: NORMAL U/L
BILIRUB SERPL-MCNC: NORMAL MG/DL
BUN BLDV-MCNC: NORMAL MG/DL
CALCIUM SERPL-MCNC: NORMAL MG/DL
CHLORIDE BLD-SCNC: NORMAL MMOL/L
CHOLESTEROL, TOTAL: NORMAL
CHOLESTEROL/HDL RATIO: NORMAL
CO2: NORMAL
CREAT SERPL-MCNC: NORMAL MG/DL
GFR CALCULATED: NORMAL
GLUCOSE BLD-MCNC: NORMAL MG/DL
HDLC SERPL-MCNC: NORMAL MG/DL
LDL CHOLESTEROL CALCULATED: NORMAL
LDL CHOLESTEROL DIRECT: NORMAL
NONHDLC SERPL-MCNC: NORMAL MG/DL
POTASSIUM SERPL-SCNC: NORMAL MMOL/L
SODIUM BLD-SCNC: NORMAL MMOL/L
TOTAL PROTEIN: NORMAL
TRIGL SERPL-MCNC: NORMAL MG/DL
TSH SERPL DL<=0.05 MIU/L-ACNC: NORMAL M[IU]/L
VLDLC SERPL CALC-MCNC: NORMAL MG/DL

## 2021-05-14 LAB — MAMMOGRAPHY, EXTERNAL: NORMAL

## 2021-08-04 ENCOUNTER — HOSPITAL ENCOUNTER (OUTPATIENT)
Age: 66
Discharge: HOME | End: 2021-08-04
Payer: MEDICARE

## 2021-08-04 LAB
ALBUMIN SERPL-MCNC: 4.4 G/DL (ref 3.5–5.2)
ALP BLD-CCNC: 72 U/L (ref 35–104)
ALT SERPL-CCNC: 12 U/L (ref 0–32)
ANION GAP SERPL CALCULATED.3IONS-SCNC: 7 MMOL/L (ref 7–16)
AST SERPL-CCNC: 18 U/L (ref 0–31)
BILIRUB SERPL-MCNC: 0.3 MG/DL (ref 0–1.2)
BUN BLDV-MCNC: 13 MG/DL (ref 6–23)
CALCIUM SERPL-MCNC: 9.5 MG/DL (ref 8.6–10.2)
CHLORIDE BLD-SCNC: 105 MMOL/L (ref 98–107)
CHOLESTEROL, TOTAL: 262 MG/DL (ref 0–199)
CO2: 27 MMOL/L (ref 22–29)
CREAT SERPL-MCNC: 0.8 MG/DL (ref 0.5–1)
GFR AFRICAN AMERICAN: >60
GFR NON-AFRICAN AMERICAN: >60 ML/MIN/1.73
GLUCOSE BLD-MCNC: 87 MG/DL (ref 74–99)
HDLC SERPL-MCNC: 104 MG/DL
LDL CHOLESTEROL CALCULATED: 133 MG/DL (ref 0–99)
POTASSIUM SERPL-SCNC: 4.2 MMOL/L (ref 3.5–5)
SODIUM BLD-SCNC: 139 MMOL/L (ref 132–146)
TOTAL PROTEIN: 7.4 G/DL (ref 6.4–8.3)
TRIGL SERPL-MCNC: 125 MG/DL (ref 0–149)
VLDLC SERPL CALC-MCNC: 25 MG/DL

## 2021-08-04 PROCEDURE — 36415 COLL VENOUS BLD VENIPUNCTURE: CPT

## 2021-08-04 PROCEDURE — 80061 LIPID PANEL: CPT

## 2021-08-04 PROCEDURE — 80053 COMPREHEN METABOLIC PANEL: CPT

## 2021-08-16 ENCOUNTER — HOSPITAL ENCOUNTER (OUTPATIENT)
Age: 66
Discharge: HOME | End: 2021-08-16
Payer: MEDICARE

## 2021-08-16 LAB — CALCIUM SERPL-MCNC: 9.2 MG/DL (ref 8.6–10.2)

## 2021-08-16 PROCEDURE — 82310 ASSAY OF CALCIUM: CPT

## 2021-08-16 PROCEDURE — 36415 COLL VENOUS BLD VENIPUNCTURE: CPT

## 2021-08-25 ENCOUNTER — HOSPITAL ENCOUNTER (OUTPATIENT)
Dept: INFUSION THERAPY | Age: 66
Setting detail: INFUSION SERIES
Discharge: HOME | End: 2021-08-25
Payer: MEDICARE

## 2021-08-25 VITALS
HEART RATE: 73 BPM | TEMPERATURE: 99 F | HEIGHT: 62 IN | BODY MASS INDEX: 29.44 KG/M2 | OXYGEN SATURATION: 98 % | WEIGHT: 160 LBS | RESPIRATION RATE: 16 BRPM | DIASTOLIC BLOOD PRESSURE: 73 MMHG | SYSTOLIC BLOOD PRESSURE: 181 MMHG

## 2021-08-25 DIAGNOSIS — M81.0 OSTEOPOROSIS, UNSPECIFIED OSTEOPOROSIS TYPE, UNSPECIFIED PATHOLOGICAL FRACTURE PRESENCE: Primary | ICD-10-CM

## 2021-08-25 PROCEDURE — 96372 THER/PROPH/DIAG INJ SC/IM: CPT

## 2021-08-25 PROCEDURE — 6360000002 HC RX W HCPCS: Performed by: INTERNAL MEDICINE

## 2021-08-25 RX ADMIN — DENOSUMAB 60 MG: 60 INJECTION SUBCUTANEOUS at 13:56

## 2021-08-25 ASSESSMENT — PAIN SCALES - GENERAL: PAINLEVEL_OUTOF10: 0

## 2022-02-09 ENCOUNTER — HOSPITAL ENCOUNTER (OUTPATIENT)
Age: 67
Discharge: HOME | End: 2022-02-09
Payer: MEDICARE

## 2022-02-09 ENCOUNTER — HOSPITAL ENCOUNTER (OUTPATIENT)
Dept: NON INVASIVE DIAGNOSTICS | Age: 67
Discharge: HOME | End: 2022-02-09
Payer: MEDICARE

## 2022-02-09 LAB
ALBUMIN SERPL-MCNC: 4.4 G/DL (ref 3.5–5.2)
ALP BLD-CCNC: 78 U/L (ref 35–104)
ALT SERPL-CCNC: 14 U/L (ref 0–32)
ANION GAP SERPL CALCULATED.3IONS-SCNC: 12 MMOL/L (ref 7–16)
AST SERPL-CCNC: 17 U/L (ref 0–31)
BASOPHILS ABSOLUTE: 0.03 E9/L (ref 0–0.2)
BASOPHILS RELATIVE PERCENT: 0.6 % (ref 0–2)
BILIRUB SERPL-MCNC: 0.2 MG/DL (ref 0–1.2)
BUN BLDV-MCNC: 13 MG/DL (ref 6–23)
CALCIUM SERPL-MCNC: 9 MG/DL (ref 8.6–10.2)
CHLORIDE BLD-SCNC: 103 MMOL/L (ref 98–107)
CHOLESTEROL, TOTAL: 248 MG/DL (ref 0–199)
CO2: 23 MMOL/L (ref 22–29)
CREAT SERPL-MCNC: 0.8 MG/DL (ref 0.5–1)
EKG ATRIAL RATE: 61 BPM
EKG P AXIS: 44 DEGREES
EKG P-R INTERVAL: 136 MS
EKG Q-T INTERVAL: 418 MS
EKG QRS DURATION: 78 MS
EKG QTC CALCULATION (BAZETT): 420 MS
EKG R AXIS: 20 DEGREES
EKG T AXIS: 61 DEGREES
EKG VENTRICULAR RATE: 61 BPM
EOSINOPHILS ABSOLUTE: 0.03 E9/L (ref 0.05–0.5)
EOSINOPHILS RELATIVE PERCENT: 0.6 % (ref 0–6)
GFR AFRICAN AMERICAN: >60
GFR NON-AFRICAN AMERICAN: >60 ML/MIN/1.73
GLUCOSE BLD-MCNC: 101 MG/DL (ref 74–99)
HCT VFR BLD CALC: 38.9 % (ref 34–48)
HDLC SERPL-MCNC: 105 MG/DL
HEMOGLOBIN: 12.8 G/DL (ref 11.5–15.5)
IMMATURE GRANULOCYTES #: 0.01 E9/L
IMMATURE GRANULOCYTES %: 0.2 % (ref 0–5)
LDL CHOLESTEROL CALCULATED: 122 MG/DL (ref 0–99)
LYMPHOCYTES ABSOLUTE: 1.72 E9/L (ref 1.5–4)
LYMPHOCYTES RELATIVE PERCENT: 32.8 % (ref 20–42)
MCH RBC QN AUTO: 30.7 PG (ref 26–35)
MCHC RBC AUTO-ENTMCNC: 32.9 % (ref 32–34.5)
MCV RBC AUTO: 93.3 FL (ref 80–99.9)
MONOCYTES ABSOLUTE: 0.49 E9/L (ref 0.1–0.95)
MONOCYTES RELATIVE PERCENT: 9.3 % (ref 2–12)
NEUTROPHILS ABSOLUTE: 2.97 E9/L (ref 1.8–7.3)
NEUTROPHILS RELATIVE PERCENT: 56.5 % (ref 43–80)
PDW BLD-RTO: 12.4 FL (ref 11.5–15)
PLATELET # BLD: 253 E9/L (ref 130–450)
PMV BLD AUTO: 10 FL (ref 7–12)
POTASSIUM SERPL-SCNC: 4 MMOL/L (ref 3.5–5)
RBC # BLD: 4.17 E12/L (ref 3.5–5.5)
SODIUM BLD-SCNC: 138 MMOL/L (ref 132–146)
TOTAL PROTEIN: 6.9 G/DL (ref 6.4–8.3)
TRIGL SERPL-MCNC: 105 MG/DL (ref 0–149)
TSH SERPL DL<=0.05 MIU/L-ACNC: 2.22 UIU/ML (ref 0.27–4.2)
VLDLC SERPL CALC-MCNC: 21 MG/DL
WBC # BLD: 5.3 E9/L (ref 4.5–11.5)

## 2022-02-09 PROCEDURE — 84443 ASSAY THYROID STIM HORMONE: CPT

## 2022-02-09 PROCEDURE — 80061 LIPID PANEL: CPT

## 2022-02-09 PROCEDURE — 80053 COMPREHEN METABOLIC PANEL: CPT

## 2022-02-09 PROCEDURE — 85025 COMPLETE CBC W/AUTO DIFF WBC: CPT

## 2022-02-09 PROCEDURE — 36415 COLL VENOUS BLD VENIPUNCTURE: CPT

## 2022-02-09 PROCEDURE — 93005 ELECTROCARDIOGRAM TRACING: CPT | Performed by: PSYCHIATRY & NEUROLOGY

## 2022-02-25 ENCOUNTER — HOSPITAL ENCOUNTER (OUTPATIENT)
Dept: INFUSION THERAPY | Age: 67
Setting detail: INFUSION SERIES
Discharge: HOME | End: 2022-02-25
Payer: MEDICARE

## 2022-02-25 VITALS
BODY MASS INDEX: 29.44 KG/M2 | SYSTOLIC BLOOD PRESSURE: 139 MMHG | DIASTOLIC BLOOD PRESSURE: 82 MMHG | RESPIRATION RATE: 16 BRPM | HEART RATE: 70 BPM | TEMPERATURE: 97.6 F | WEIGHT: 160 LBS | HEIGHT: 62 IN | OXYGEN SATURATION: 100 %

## 2022-02-25 DIAGNOSIS — M81.0 OSTEOPOROSIS, UNSPECIFIED OSTEOPOROSIS TYPE, UNSPECIFIED PATHOLOGICAL FRACTURE PRESENCE: Primary | ICD-10-CM

## 2022-02-25 PROCEDURE — 96372 THER/PROPH/DIAG INJ SC/IM: CPT

## 2022-02-25 PROCEDURE — 6360000002 HC RX W HCPCS: Performed by: INTERNAL MEDICINE

## 2022-02-25 RX ADMIN — DENOSUMAB 60 MG: 60 INJECTION SUBCUTANEOUS at 14:09

## 2022-04-25 ENCOUNTER — HOSPITAL ENCOUNTER (OUTPATIENT)
Dept: NON INVASIVE DIAGNOSTICS | Age: 67
Discharge: HOME | End: 2022-04-25
Payer: MEDICARE

## 2022-04-25 ENCOUNTER — HOSPITAL ENCOUNTER (OUTPATIENT)
Age: 67
Discharge: HOME | End: 2022-04-25
Payer: MEDICARE

## 2022-04-25 LAB
ALBUMIN SERPL-MCNC: 4.5 G/DL (ref 3.5–5.2)
ALP BLD-CCNC: 82 U/L (ref 35–104)
ALT SERPL-CCNC: 17 U/L (ref 0–32)
ANION GAP SERPL CALCULATED.3IONS-SCNC: 10 MMOL/L (ref 7–16)
AST SERPL-CCNC: 21 U/L (ref 0–31)
BASOPHILS ABSOLUTE: 0.04 E9/L (ref 0–0.2)
BASOPHILS RELATIVE PERCENT: 0.8 % (ref 0–2)
BILIRUB SERPL-MCNC: 0.3 MG/DL (ref 0–1.2)
BUN BLDV-MCNC: 20 MG/DL (ref 6–23)
CALCIUM SERPL-MCNC: 9.4 MG/DL (ref 8.6–10.2)
CHLORIDE BLD-SCNC: 107 MMOL/L (ref 98–107)
CHOLESTEROL, TOTAL: 250 MG/DL (ref 0–199)
CO2: 26 MMOL/L (ref 22–29)
CREAT SERPL-MCNC: 0.9 MG/DL (ref 0.5–1)
EOSINOPHILS ABSOLUTE: 0.05 E9/L (ref 0.05–0.5)
EOSINOPHILS RELATIVE PERCENT: 1 % (ref 0–6)
GFR AFRICAN AMERICAN: >60
GFR NON-AFRICAN AMERICAN: >60 ML/MIN/1.73
GLUCOSE BLD-MCNC: 93 MG/DL (ref 74–99)
HCT VFR BLD CALC: 40.1 % (ref 34–48)
HDLC SERPL-MCNC: 111 MG/DL
HEMOGLOBIN: 13.2 G/DL (ref 11.5–15.5)
IMMATURE GRANULOCYTES #: 0.01 E9/L
IMMATURE GRANULOCYTES %: 0.2 % (ref 0–5)
LDL CHOLESTEROL CALCULATED: 117 MG/DL (ref 0–99)
LYMPHOCYTES ABSOLUTE: 1.98 E9/L (ref 1.5–4)
LYMPHOCYTES RELATIVE PERCENT: 41.4 % (ref 20–42)
MCH RBC QN AUTO: 31.1 PG (ref 26–35)
MCHC RBC AUTO-ENTMCNC: 32.9 % (ref 32–34.5)
MCV RBC AUTO: 94.6 FL (ref 80–99.9)
MONOCYTES ABSOLUTE: 0.47 E9/L (ref 0.1–0.95)
MONOCYTES RELATIVE PERCENT: 9.8 % (ref 2–12)
NEUTROPHILS ABSOLUTE: 2.23 E9/L (ref 1.8–7.3)
NEUTROPHILS RELATIVE PERCENT: 46.8 % (ref 43–80)
PDW BLD-RTO: 12.6 FL (ref 11.5–15)
PLATELET # BLD: 223 E9/L (ref 130–450)
PMV BLD AUTO: 9.7 FL (ref 7–12)
POTASSIUM SERPL-SCNC: 4.3 MMOL/L (ref 3.5–5)
RBC # BLD: 4.24 E12/L (ref 3.5–5.5)
SODIUM BLD-SCNC: 143 MMOL/L (ref 132–146)
TOTAL PROTEIN: 7.3 G/DL (ref 6.4–8.3)
TRIGL SERPL-MCNC: 112 MG/DL (ref 0–149)
TSH SERPL DL<=0.05 MIU/L-ACNC: 3.03 UIU/ML (ref 0.27–4.2)
VLDLC SERPL CALC-MCNC: 22 MG/DL
WBC # BLD: 4.8 E9/L (ref 4.5–11.5)

## 2022-04-25 PROCEDURE — 36415 COLL VENOUS BLD VENIPUNCTURE: CPT

## 2022-04-25 PROCEDURE — 80053 COMPREHEN METABOLIC PANEL: CPT

## 2022-04-25 PROCEDURE — 85025 COMPLETE CBC W/AUTO DIFF WBC: CPT

## 2022-04-25 PROCEDURE — 84443 ASSAY THYROID STIM HORMONE: CPT

## 2022-04-25 PROCEDURE — 80061 LIPID PANEL: CPT

## 2022-04-25 PROCEDURE — 93005 ELECTROCARDIOGRAM TRACING: CPT | Performed by: PSYCHIATRY & NEUROLOGY

## 2022-04-26 LAB
EKG ATRIAL RATE: 56 BPM
EKG P AXIS: 49 DEGREES
EKG P-R INTERVAL: 138 MS
EKG Q-T INTERVAL: 436 MS
EKG QRS DURATION: 78 MS
EKG QTC CALCULATION (BAZETT): 420 MS
EKG R AXIS: 19 DEGREES
EKG T AXIS: 51 DEGREES
EKG VENTRICULAR RATE: 56 BPM

## 2022-04-26 PROCEDURE — 93010 ELECTROCARDIOGRAM REPORT: CPT | Performed by: INTERNAL MEDICINE

## 2022-07-06 ENCOUNTER — APPOINTMENT (OUTPATIENT)
Dept: GENERAL RADIOLOGY | Age: 67
End: 2022-07-06
Payer: MEDICARE

## 2022-07-06 ENCOUNTER — HOSPITAL ENCOUNTER (EMERGENCY)
Age: 67
Discharge: HOME | End: 2022-07-06
Attending: STUDENT IN AN ORGANIZED HEALTH CARE EDUCATION/TRAINING PROGRAM
Payer: MEDICARE

## 2022-07-06 ENCOUNTER — APPOINTMENT (OUTPATIENT)
Dept: CT IMAGING | Age: 67
End: 2022-07-06
Payer: MEDICARE

## 2022-07-06 VITALS
RESPIRATION RATE: 16 BRPM | TEMPERATURE: 98.2 F | DIASTOLIC BLOOD PRESSURE: 79 MMHG | SYSTOLIC BLOOD PRESSURE: 138 MMHG | WEIGHT: 160 LBS | BODY MASS INDEX: 29.44 KG/M2 | OXYGEN SATURATION: 100 % | HEIGHT: 62 IN | HEART RATE: 61 BPM
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
## 2022-07-06 DIAGNOSIS — S52.602A CLOSED FRACTURE OF DISTAL ENDS OF LEFT RADIUS AND ULNA, INITIAL ENCOUNTER: Primary | ICD-10-CM

## 2022-07-06 DIAGNOSIS — S20.211A CONTUSION OF RIB ON RIGHT SIDE, INITIAL ENCOUNTER: ICD-10-CM

## 2022-07-06 DIAGNOSIS — S09.90XA CLOSED HEAD INJURY, INITIAL ENCOUNTER: ICD-10-CM

## 2022-07-06 DIAGNOSIS — S52.502A CLOSED FRACTURE OF DISTAL ENDS OF LEFT RADIUS AND ULNA, INITIAL ENCOUNTER: Primary | ICD-10-CM

## 2022-07-06 PROCEDURE — 70450 CT HEAD/BRAIN W/O DYE: CPT

## 2022-07-06 PROCEDURE — 72125 CT NECK SPINE W/O DYE: CPT

## 2022-07-06 PROCEDURE — 6370000000 HC RX 637 (ALT 250 FOR IP): Performed by: NURSE PRACTITIONER

## 2022-07-06 PROCEDURE — 71101 X-RAY EXAM UNILAT RIBS/CHEST: CPT

## 2022-07-06 PROCEDURE — 99284 EMERGENCY DEPT VISIT MOD MDM: CPT

## 2022-07-06 PROCEDURE — 2500000003 HC RX 250 WO HCPCS: Performed by: STUDENT IN AN ORGANIZED HEALTH CARE EDUCATION/TRAINING PROGRAM

## 2022-07-06 PROCEDURE — 73110 X-RAY EXAM OF WRIST: CPT

## 2022-07-06 RX ORDER — LIDOCAINE HYDROCHLORIDE 10 MG/ML
20 INJECTION, SOLUTION INFILTRATION; PERINEURAL ONCE
Status: COMPLETED | OUTPATIENT
Start: 2022-07-06 | End: 2022-07-06

## 2022-07-06 RX ORDER — HYDROCODONE BITARTRATE AND ACETAMINOPHEN 5; 325 MG/1; MG/1
1 TABLET ORAL ONCE
Status: COMPLETED | OUTPATIENT
Start: 2022-07-06 | End: 2022-07-06

## 2022-07-06 RX ORDER — HYDROCODONE BITARTRATE AND ACETAMINOPHEN 5; 325 MG/1; MG/1
1 TABLET ORAL EVERY 8 HOURS PRN
Qty: 9 TABLET | Refills: 0 | Status: SHIPPED | OUTPATIENT
Start: 2022-07-06 | End: 2022-07-09

## 2022-07-06 RX ADMIN — HYDROCODONE BITARTRATE AND ACETAMINOPHEN 1 TABLET: 5; 325 TABLET ORAL at 15:35

## 2022-07-06 RX ADMIN — LIDOCAINE HYDROCHLORIDE 20 ML: 10 INJECTION, SOLUTION INFILTRATION; PERINEURAL at 15:38

## 2022-07-06 ASSESSMENT — PAIN - FUNCTIONAL ASSESSMENT
PAIN_FUNCTIONAL_ASSESSMENT: INTOLERABLE, UNABLE TO DO ANY ACTIVE OR PASSIVE ACTIVITIES
PAIN_FUNCTIONAL_ASSESSMENT: 0-10
PAIN_FUNCTIONAL_ASSESSMENT: INTOLERABLE, UNABLE TO DO ANY ACTIVE OR PASSIVE ACTIVITIES

## 2022-07-06 ASSESSMENT — PAIN DESCRIPTION - LOCATION
LOCATION: WRIST
LOCATION: ARM;WRIST;HAND
LOCATION: ARM

## 2022-07-06 ASSESSMENT — PAIN DESCRIPTION - ORIENTATION
ORIENTATION: LEFT

## 2022-07-06 ASSESSMENT — PAIN DESCRIPTION - DESCRIPTORS
DESCRIPTORS: THROBBING;POUNDING;SHOOTING
DESCRIPTORS: PATIENT UNABLE TO DESCRIBE

## 2022-07-06 ASSESSMENT — PAIN SCALES - GENERAL
PAINLEVEL_OUTOF10: 5
PAINLEVEL_OUTOF10: 10
PAINLEVEL_OUTOF10: 10

## 2022-07-06 NOTE — ED NOTES
Department of Emergency Medicine  FIRST PROVIDER TRIAGE NOTE             [facility]           7/6/22  1:22 PM EDT    Date of Encounter: 7/6/22   MRN: [id number]      HPI: Everett Rowe is a 77 y.o. female who presents to the ED for Fall (fell down steps and hit left arm off of floor, + head injury, no loc, no thinners went to urgent care and got it wrapped and was sent here, no LOC, no thinners) and Arm Injury (left )     Patient states she had a mechanical fall where she fell down a few steps and landed on the landing. She admits to head strike but no loss of consciousness. He denies any anticoagulations. Complains of left wrist pain which she was seen at [facility] and was sent in to have it reduced. She also states she has right rib pain    Vitals:    07/06/22 1322   BP: 138/79   Pulse: 61   Resp: 16   Temp: 98.2 °F (36.8 °C)   SpO2: 100%       ROS: Negative for cp or sob. PE: Gen Appearance/Constitutional: alert  HEENT: NC/NT. PERRLA,  Airway patent. Initial Plan of Care: All treatment areas with department are currently occupied. Plan to order/Initiate the following while awaiting opening in ED: imaging studies.   Initiate Treatment-Testing, Proceed toTreatment Area When Bed Available for ED Attending/MLP to Continue Care    Electronically signed by Milas Hodgkins, APRN - CNP   DD: 7/6/22         Milas Hodgkins, APRN - CNP  07/06/22 3980

## 2022-07-06 NOTE — ED NOTES
Joint Reduction Procedure Note    Indication: fracture    Consent: The patient was counseled regarding the procedure, it's indications, risks, potential complications and alternatives and any questions were answered. Consent was obtained. Procedure: The pre-reduction exam showed distal perfusion & neurologic function to be normal. The patient was placed in the appropriate position. Anesthesia/pain control was obtained using a hematoma block of the affected area using 4.0 cc of 1% Lidocaine without epinephrine. Reduction of the left wrist was performed by traction and counter traction and hanging with weights. Post reduction films are pending. A post-reduction exam revealed distal perfusion & neurologic function to be normal. The affected area was immobilized with a volar wrist splint. The patient tolerated the procedure well.     Complications: None    Bernerd Brittle EM PGY2        Darilyn Bosworth, DO  Resident  07/06/22 5953

## 2022-07-06 NOTE — ED PROVIDER NOTES
tobacco. She reports previous alcohol use. She reports that she does not use drugs. Family History: family history includes Diabetes in her mother; Heart Failure in her mother. Allergies: Patient has no known allergies. Physical Exam   Oxygen Saturation Interpretation: Normal.        ED Triage Vitals [07/06/22 1322]   BP Temp Temp Source Heart Rate Resp SpO2 Height Weight   138/79 98.2 °F (36.8 °C) Oral 61 16 100 % 5' 2\" (1.575 m) 160 lb (72.6 kg)         Physical Exam  Constitutional:  Alert, development consistent with age. HEENT:  NC/NT. Airway patent. Atraumatic  Neck:  No midline or paravertebral tenderness. Normal ROM. Supple. Chest:  Symmetrical without visible rash . Right lateral rib tenderness on palpation. No wounds or abrasions noted. No ecchymosis. Respiratory:  Lungs Clear to auscultation and breath sounds equal.  CV:  Regular rate and rhythm, normal heart sounds, without pathological murmurs, ectopy, gallops, or rubs. GI:  Abdomen Soft, nontender, good bowel sounds. No firm or pulsatile mass. Pelvis:  Stable, nontender to palpation. Back:  No midline or paravertebral tenderness. No costovertebral tenderness. Extremities: No tenderness or edema noted. Left Wrist: diffusely across carpal bones. Tenderness:  moderate. Swelling: Mild. Deformity: visual deformity present. ROM: diminished range with pain. Skin:  no wounds or erythema. Neurovascular: Motor deficit: none. Sensory deficit:   none. Pulse deficit: none. Capillary refill: normal.  Left Elbow: diffusely across elbow            Tenderness: none              Swelling: None. Deformity: no deformity observed/palpated. ROM: full range of motion. Skin:  no wounds, erythema, or swelling. Left Hand: all metacarpals             Tenderness: none              Swelling: None. Deformity: no deformity observed/palpated. ROM: full range of motion. Skin:  no wounds, erythema, or swelling. Integument:  Normal turgor. Warm, dry, without visible rash, unless noted elsewhere. Lymphatic: no lymphadenopathy noted  Neurological:  Oriented x3, GCS 15. Motor functions intact. Lab / Imaging Results   (All laboratory and radiology results have been personally reviewed by myself)  Labs:  No results found for this visit on 07/06/22. Imaging: All Radiology results interpreted by Radiologist unless otherwise noted. XR WRIST LEFT (MIN 3 VIEWS)   Final Result   Left distal radius and ulnar styloid fractures post reduction as described. XR WRIST LEFT (MIN 3 VIEWS)   Final Result   Unremarkable chest and right ribs. Comminuted distal radial fracture with mild vertex anterior angulation. XR RIBS RIGHT INCLUDE CHEST (MIN 3 VIEWS)   Final Result   Unremarkable chest and right ribs. Comminuted distal radial fracture with mild vertex anterior angulation. CT Head WO Contrast   Final Result   No acute intracranial abnormality. RECOMMENDATIONS:   Unavailable         CT Cervical Spine WO Contrast   Final Result   No acute abnormality of the cervical spine. 2 cm right lower lobe thyroid nodule is again demonstrated. Further   outpatient evaluation thyroid may be obtained with thyroid ultrasound if not   previously performed. ED Course / Medical Decision Making     Medications   HYDROcodone-acetaminophen (NORCO) 5-325 MG per tablet 1 tablet (1 tablet Oral Given 7/6/22 1535)   lidocaine 1 % injection 20 mL (20 mLs IntraDERmal Given 7/6/22 1538)        Re-examination:  7/6/22     Time: 1730-reevaluated patient post splint application. No neurovascular compromise noted. Patients symptoms are improving.     Consult(s):   None    Procedure(s):  ER residents procedure note for reduction of the right distal     MDM:   Patient

## 2022-07-07 ENCOUNTER — APPOINTMENT (OUTPATIENT)
Dept: CT IMAGING | Age: 67
End: 2022-07-07
Payer: MEDICARE

## 2022-07-07 ENCOUNTER — HOSPITAL ENCOUNTER (EMERGENCY)
Age: 67
Discharge: HOME | End: 2022-07-07
Attending: EMERGENCY MEDICINE
Payer: MEDICARE

## 2022-07-07 VITALS
OXYGEN SATURATION: 97 % | WEIGHT: 150 LBS | TEMPERATURE: 97.8 F | BODY MASS INDEX: 24.99 KG/M2 | DIASTOLIC BLOOD PRESSURE: 70 MMHG | RESPIRATION RATE: 14 BRPM | HEART RATE: 62 BPM | HEIGHT: 65 IN | SYSTOLIC BLOOD PRESSURE: 137 MMHG
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
## 2022-07-07 PROCEDURE — 99284 EMERGENCY DEPT VISIT MOD MDM: CPT

## 2022-07-07 PROCEDURE — 70486 CT MAXILLOFACIAL W/O DYE: CPT

## 2022-07-07 PROCEDURE — 6370000000 HC RX 637 (ALT 250 FOR IP): Performed by: STUDENT IN AN ORGANIZED HEALTH CARE EDUCATION/TRAINING PROGRAM

## 2022-07-07 PROCEDURE — 72125 CT NECK SPINE W/O DYE: CPT

## 2022-07-07 PROCEDURE — 70450 CT HEAD/BRAIN W/O DYE: CPT

## 2022-07-07 RX ORDER — ACETAMINOPHEN 325 MG/1
650 TABLET ORAL ONCE
Status: COMPLETED | OUTPATIENT
Start: 2022-07-07 | End: 2022-07-07

## 2022-07-07 RX ADMIN — ACETAMINOPHEN 650 MG: 325 TABLET ORAL at 12:12

## 2022-07-07 ASSESSMENT — ENCOUNTER SYMPTOMS
VOMITING: 0
DIARRHEA: 0
EYE PAIN: 0
SINUS PRESSURE: 0
CHEST TIGHTNESS: 0
BACK PAIN: 0
ABDOMINAL PAIN: 0
COUGH: 0
EYE REDNESS: 0
CONSTIPATION: 0
SHORTNESS OF BREATH: 0
NAUSEA: 0
SINUS PAIN: 0

## 2022-07-07 ASSESSMENT — PAIN - FUNCTIONAL ASSESSMENT: PAIN_FUNCTIONAL_ASSESSMENT: NONE - DENIES PAIN

## 2022-07-07 ASSESSMENT — PAIN SCALES - GENERAL: PAINLEVEL_OUTOF10: 6

## 2022-07-07 NOTE — CARE COORDINATION
Social Work /Transition of Care:    Pt presents to the ED after a second fall at group home and a headache. Pt was at [facility] ED yesterday after an initial fall at the group home. Pt is from a group home that is staffed with 24hr care and assistance to the residents provided by Angelia Silva is her own guardian. Pt does have an SSA through the [employer] (Antoniosisi Mcelroy [phone]). SW met with pt and  from [facility] Kurtis Rubinstein [phone]). Pt was sitting up in bed, c-collar in place. Pt normally ambulates independently but sometimes has an unsteady gait. Pt reported no DME. Pt's PCP is Dr Babs Councilman and she uses [facility] to fill her prescriptions.

## 2022-07-07 NOTE — ED PROVIDER NOTES
management comments: This is a 77year old female who presents to the ED due to headache. Patient was given a dose of Toradol in the emergency room. She was also placed in a cervical collar upon presentation. CT head revealed no acute intracranial abnormality with atrophy and periventricular leukomalacia as well as left periorbital soft tissue swelling and small left lateral scalp contusion. CT cervical spine revealed no acute compression fracture or subluxation of the cervical spine. Patient C-spine was cleared in the emergency department and c-collar was removed. CT facial noted no acute facial bone trauma. The patient will be discharged home at this time and told to continue ibuprofen and Tylenol for her pain. She is been told to follow with her primary care provider and orthopedic surgeon regarding her injuries. She has been told to return to the ED if her symptoms come back, worsen or change anytime.                --------------------------------------------- PAST HISTORY ---------------------------------------------  Past Medical History:  has a past medical history of Anemia, Clavicle fracture, Helicobacter pylori infection, Mental disability, Osteopenia, Pneumonia, Thyroid disease, and Varicose veins. Past Surgical History:  has a past surgical history that includes Tonsillectomy; cyst removal; Heel spur surgery; Colonoscopy (2015); Endoscopy, colon, diagnostic; and Thyroidectomy, Completion. Social History:  reports that she has never smoked. She has never used smokeless tobacco. She reports previous alcohol use. She reports that she does not use drugs. Family History: family history includes Diabetes in her mother; Heart Failure in her mother. The patients home medications have been reviewed. Allergies: Patient has no known allergies.     -------------------------------------------------- RESULTS -------------------------------------------------  Labs:  No results found for this visit on 07/07/22. Radiology:  CT Head WO Contrast   Final Result   1. There is no acute intracranial abnormality. Specifically, there is no   intracranial hemorrhage. 2. Atrophy and periventricular leukomalacia,   3. Left periorbital soft tissue swelling   4. Small left lateral scalp contusion         CT Cervical Spine WO Contrast   Final Result   1. There is no acute compression fracture or subluxation of the cervical   spine. 2. Multilevel degenerative disc and degenerative joint disease. CT FACIAL BONES WO CONTRAST   Final Result   No acute facial bone trauma. ------------------------- NURSING NOTES AND VITALS REVIEWED ---------------------------  Date / Time Roomed:  7/7/2022 11:51 AM  ED Bed Assignment:  05/21    The nursing notes within the ED encounter and vital signs as below have been reviewed. /70   Pulse 62   Temp 97.8 °F (36.6 °C)   Resp 14   Ht 5' 5\" (1.651 m)   Wt 150 lb (68 kg)   SpO2 97%   BMI 24.96 kg/m²   Oxygen Saturation Interpretation: Normal      ------------------------------------------ PROGRESS NOTES ------------------------------------------  4:21 PM EDT  I have spoken with the patient and discussed todays results, in addition to providing specific details for the plan of care and counseling regarding the diagnosis and prognosis. Their questions are answered at this time and they are agreeable with the plan. I discussed at length with them reasons for immediate return here for re evaluation. They will followup with their primary care physician and orthopedic surgeon by calling their office tomorrow. --------------------------------- ADDITIONAL PROVIDER NOTES ---------------------------------  At this time the patient is without objective evidence of an acute process requiring hospitalization or inpatient management.   They have remained hemodynamically stable throughout their entire ED visit and are stable for discharge with outpatient follow-up. The plan has been discussed in detail and they are aware of the specific conditions for emergent return, as well as the importance of follow-up. Discharge Medication List as of 7/7/2022  2:06 PM          Diagnosis:  1. Fall, initial encounter        Disposition:  Patient's disposition: Discharge to home  Patient's condition is stable. Patient was seen and evaluated by myself and my attending Khadar Hamilton MD. Assessment and Plan discussed with attending provider, please see attestation for final plan of care.      DO Fern García DO  Resident  07/10/22 5751

## 2022-07-07 NOTE — PROGRESS NOTES
New Wrist/Hand Patient Visit     Referring Provider:   No referring provider defined for this encounter. CHIEF COMPLAINT:   Chief Complaint   Patient presents with    Arm Injury     Pt presents this AM with c/o arm pain related to a recent closed fracture of the L distal radius and ulna. States that she fell down the steps and fractured her arm. States that wrist has been sore. Presents with UE splinted and in a sling. HPI:      Alejandra Jean-Baptiste is a 77y.o. year old female who is seen today  for evaluation of left wrist pain. She reports the pain has been ongoing for the past 1 weeks. She injured the wrist in a fall. She sustained a distal radius fracture which was provisionally reduced in the emergency department and she was placed into a splint.   She follows up here today for further management      PAST MEDICAL HISTORY  Past Medical History:   Diagnosis Date    Anemia     Clavicle fracture     Helicobacter pylori infection 12/15    Mental disability     Osteopenia     gets yearly injections    Pneumonia     Thyroid disease     Varicose veins        PAST SURGICAL HISTORY  Past Surgical History:   Procedure Laterality Date    COLONOSCOPY  2015    CYST REMOVAL      (R) wrist    ENDOSCOPY, COLON, DIAGNOSTIC      HEEL SPUR SURGERY      (L) heel    THYROIDECTOMY, COMPLETION      TONSILLECTOMY         FAMILY HISTORY   Family History   Problem Relation Age of Onset    Diabetes Mother     Heart Failure Mother        SOCIAL HISTORY  Social History     Occupational History    Not on file   Tobacco Use    Smoking status: Never Smoker    Smokeless tobacco: Never Used   Vaping Use    Vaping Use: Never used   Substance and Sexual Activity    Alcohol use: Not Currently     Alcohol/week: 0.0 standard drinks    Drug use: No    Sexual activity: Never       CURRENT MEDICATIONS     Current Outpatient Medications:     Cholecalciferol (VITAMIN D3) 50 MCG (2000 UT) CAPS, Take 1 capsule by mouth daily, Disp: , Rfl:     bismuth subsalicylate (PEPTO BISMOL) 262 MG/15ML suspension, Take 15 mLs by mouth every 6 hours as needed for Indigestion, Disp: , Rfl:     medicated lip balm (BLISTEX/CARMEX) 2-2.5-6.6 % STCK, Apply topically as needed for Dry Lips, Disp: , Rfl:     Ibuprofen (ADVIL PO), Take by mouth, Disp: , Rfl:     magnesium hydroxide (MILK OF MAGNESIA CONCENTRATE) 2400 MG/10ML SUSP, Take 2,400 mg by mouth once as needed, Disp: , Rfl:     traZODone (DESYREL) 50 MG tablet, Take 50 mg by mouth nightly, Disp: , Rfl:     OXcarbazepine (TRILEPTAL) 150 MG tablet, Take 1 tablet by mouth 2 times daily for 14 days (Patient taking differently: Take 600 mg by mouth daily ), Disp: 28 tablet, Rfl: 0    OLANZapine (ZYPREXA) 5 MG tablet, Take 1 tablet by mouth nightly for 14 days (Patient taking differently: Take 10 mg by mouth nightly ), Disp: 14 tablet, Rfl: 0    ketoconazole (NIZORAL) 2 % shampoo, Apply topically daily Apply topically daily as needed. , Disp: , Rfl:     Omega-3 Fatty Acids (FISH OIL) 1000 MG CAPS, Take 1,000 mg by mouth 3 times daily, Disp: , Rfl:     levothyroxine (SYNTHROID) 25 MCG tablet, 25 mcg Daily 2 tabs daily, Disp: , Rfl:     denosumab (PROLIA) 60 MG/ML SOLN SC injection, Inject 60 mg into the skin once Every 6 months at [facility], Disp: , Rfl:     Multiple Vitamins-Minerals (THERAPEUTIC MULTIVITAMIN-MINERALS) tablet, Take 1 tablet by mouth daily. , Disp: , Rfl:     Elastic Bandages & Supports (JOBST KNEE HIGH COMPRESSION SM) MISC, Compression stockings 20-30 mm hg thigh high bilaterally Dx :  Venous Insufficiency, Swelling Please provide scooby, Disp: 2 each, Rfl: 2    ALLERGIES  No Known Allergies    Controlled Substances Monitoring:        REVIEW OF SYSTEMS:     Constitutional:  Negative for weight loss, fevers, chills, fatigue  Cardiovascular: Negative for chest pain, palpitations  Pulmonary: Negative for shortness of breath, labored breathing, cough  GI: negative for abdominal pain, nausea, vomitting   MSK: per HPI  Skin: negative for rash, open wounds    All other systems reviewed and are negative           PHYSICAL EXAM     Vitals:    07/11/22 1359   Weight: 150 lb (68 kg)   Height: 5' 5\" (1.651 m)       Height: 5' 5\" (1.651 m)  Weight: [unfilled]  BMI:  Body mass index is 24.96 kg/m². General: The patient is alert and oriented x 3, appears to be stated age and in no distress. HEENT: head is normocephalic, atraumatic. EOMI. Neck: supple, trachea midline, no thyromegaly   Cardiovascular: peripheral pulses palpable. Normal Capillary refill   Respiratory: breathing unlabored, chest expansion symmetric   Skin: no rash, no open wounds, no erythema  Psych: normal affect; mood stable  Neurologic: gait normal, sensation grossly intact in extremities  MSK:      Hand/Wrist:  Him of the wrist out of the splint shows some resolving bruising about the wrist.  There is minimal pain with palpation. There is intact motor and sensory function to radial, ulnar, median nerves. Elbow range of motion is intact. IMAGING:     XRAY:  3 views of the left wrist after closed reduction and cast application show acceptable alignment with mild shortening mild dorsal tilt    Impression: Acceptable alignment of distal radius fracture with mild shortening and mild dorsal tilt    Radiographic findings reviewed with patient    ASSESSMENT  Left distal radius fracture      PLAN  We discussed her wrist today. I think she will continue to do well with nonoperative treatment. She was placed into a cast today.   She will follow-up in 4 weeks with images of the wrist.  These will be out of her cast.        Marlen Nelson MD  Orthopaedic Surgery   7/7/22  5:16 PM

## 2022-07-11 ENCOUNTER — OFFICE VISIT (OUTPATIENT)
Dept: ORTHOPEDIC SURGERY | Age: 67
End: 2022-07-11

## 2022-07-11 VITALS — WEIGHT: 150 LBS | HEIGHT: 65 IN | BODY MASS INDEX: 24.99 KG/M2

## 2022-07-11 DIAGNOSIS — S52.502A CLOSED FRACTURE OF DISTAL END OF LEFT RADIUS, UNSPECIFIED FRACTURE MORPHOLOGY, INITIAL ENCOUNTER: Primary | ICD-10-CM

## 2022-07-11 PROCEDURE — 1090F PRES/ABSN URINE INCON ASSESS: CPT | Performed by: ORTHOPAEDIC SURGERY

## 2022-07-11 PROCEDURE — 1036F TOBACCO NON-USER: CPT | Performed by: ORTHOPAEDIC SURGERY

## 2022-07-11 PROCEDURE — G8420 CALC BMI NORM PARAMETERS: HCPCS | Performed by: ORTHOPAEDIC SURGERY

## 2022-07-11 PROCEDURE — G8427 DOCREV CUR MEDS BY ELIG CLIN: HCPCS | Performed by: ORTHOPAEDIC SURGERY

## 2022-07-11 PROCEDURE — G8400 PT W/DXA NO RESULTS DOC: HCPCS | Performed by: ORTHOPAEDIC SURGERY

## 2022-07-11 PROCEDURE — 1123F ACP DISCUSS/DSCN MKR DOCD: CPT | Performed by: ORTHOPAEDIC SURGERY

## 2022-07-11 PROCEDURE — 99204 OFFICE O/P NEW MOD 45 MIN: CPT | Performed by: ORTHOPAEDIC SURGERY

## 2022-07-11 PROCEDURE — 3017F COLORECTAL CA SCREEN DOC REV: CPT | Performed by: ORTHOPAEDIC SURGERY

## 2022-07-21 ENCOUNTER — APPOINTMENT (OUTPATIENT)
Dept: GENERAL RADIOLOGY | Age: 67
End: 2022-07-21
Payer: MEDICARE

## 2022-07-21 ENCOUNTER — APPOINTMENT (OUTPATIENT)
Dept: CT IMAGING | Age: 67
End: 2022-07-21
Payer: MEDICARE

## 2022-07-21 ENCOUNTER — HOSPITAL ENCOUNTER (EMERGENCY)
Age: 67
Discharge: HOME | End: 2022-07-21
Payer: MEDICARE

## 2022-07-21 VITALS
SYSTOLIC BLOOD PRESSURE: 113 MMHG | RESPIRATION RATE: 18 BRPM | HEART RATE: 69 BPM | TEMPERATURE: 97.5 F | OXYGEN SATURATION: 96 % | DIASTOLIC BLOOD PRESSURE: 58 MMHG
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
## 2022-07-21 DIAGNOSIS — S80.02XA CONTUSION OF LEFT KNEE, INITIAL ENCOUNTER: ICD-10-CM

## 2022-07-21 DIAGNOSIS — S09.90XA INJURY OF HEAD, INITIAL ENCOUNTER: Primary | ICD-10-CM

## 2022-07-21 PROCEDURE — 93005 ELECTROCARDIOGRAM TRACING: CPT | Performed by: NURSE PRACTITIONER

## 2022-07-21 PROCEDURE — 73562 X-RAY EXAM OF KNEE 3: CPT

## 2022-07-21 PROCEDURE — 72125 CT NECK SPINE W/O DYE: CPT

## 2022-07-21 PROCEDURE — 70450 CT HEAD/BRAIN W/O DYE: CPT

## 2022-07-21 PROCEDURE — 99284 EMERGENCY DEPT VISIT MOD MDM: CPT

## 2022-07-21 PROCEDURE — 6370000000 HC RX 637 (ALT 250 FOR IP): Performed by: NURSE PRACTITIONER

## 2022-07-21 RX ORDER — ACETAMINOPHEN 500 MG
1000 TABLET ORAL ONCE
Status: COMPLETED | OUTPATIENT
Start: 2022-07-21 | End: 2022-07-21

## 2022-07-21 RX ADMIN — ACETAMINOPHEN 1000 MG: 500 TABLET ORAL at 09:28

## 2022-07-21 ASSESSMENT — PAIN DESCRIPTION - ORIENTATION: ORIENTATION: LEFT

## 2022-07-21 ASSESSMENT — PAIN - FUNCTIONAL ASSESSMENT
PAIN_FUNCTIONAL_ASSESSMENT: 0-10
PAIN_FUNCTIONAL_ASSESSMENT: ACTIVITIES ARE NOT PREVENTED

## 2022-07-21 ASSESSMENT — PAIN DESCRIPTION - LOCATION: LOCATION: KNEE

## 2022-07-21 ASSESSMENT — PAIN DESCRIPTION - PAIN TYPE: TYPE: ACUTE PAIN

## 2022-07-21 ASSESSMENT — PAIN DESCRIPTION - DESCRIPTORS: DESCRIPTORS: SHARP;ACHING

## 2022-07-21 ASSESSMENT — PAIN SCALES - GENERAL: PAINLEVEL_OUTOF10: 6

## 2022-07-21 NOTE — ED PROVIDER NOTES
[facility]    HPI:  7/21/22,   Time: 9:27 AM EDT         Julian Roberts is a 77 y.o. female presenting to the ED for fall and head injury, beginning this a.m. ago. The complaint has been persistent, mild in severity, and worsened by nothing. Presents for complaints of left knee pain after she had a mechanical fall going down the steps at approximately 8:00 this morning. States that she fell down approximately 6 steps. Denies any loss of conscious. She does have an abrasion to the anterior the left knee. She is currently with a cast on the left forearm from a fall recently as well she does have visible ecchymosis that appears at various stages of healing from previous falls as well. ROS:   Pertinent positives and negatives are stated within HPI, all other systems reviewed and are negative.  --------------------------------------------- PAST HISTORY ---------------------------------------------  Past Medical History:  has a past medical history of Anemia, Clavicle fracture, Helicobacter pylori infection, Mental disability, Osteopenia, Pneumonia, Thyroid disease, and Varicose veins. Past Surgical History:  has a past surgical history that includes Tonsillectomy; cyst removal; Heel spur surgery; Colonoscopy (2015); Endoscopy, colon, diagnostic; and Thyroidectomy, Completion. Social History:  reports that she has never smoked. She has never used smokeless tobacco. She reports that she does not currently use alcohol. She reports that she does not use drugs. Family History: family history includes Diabetes in her mother; Heart Failure in her mother. The patients home medications have been reviewed. Allergies: Patient has no known allergies.     -------------------------------------------------- RESULTS -------------------------------------------------  All laboratory and radiology results have been personally reviewed by myself   LABS:  Results for orders placed or performed during the hospital encounter of 07/21/22   EKG 12 Lead   Result Value Ref Range    Ventricular Rate 58 BPM    Atrial Rate 58 BPM    P-R Interval 142 ms    QRS Duration 80 ms    Q-T Interval 424 ms    QTc Calculation (Bazett) 416 ms    P Axis 52 degrees    R Axis 20 degrees    T Axis 80 degrees       RADIOLOGY:  Interpreted by Radiologist.  XR KNEE LEFT (3 VIEWS)   Final Result   Pronounced tricompartmental osteoarthritis with valgus deformity and small   joint effusion. CT HEAD WO CONTRAST   Final Result   1. No acute intracranial abnormality. 2. Mild chronic small vessel ischemic disease. 3. Remote insult in the right temporal lobe. 4. No acute fracture or subluxation within the cervical spine. 5. Right thyroid nodule measures at least 2.2 cm in size. Recommend thyroid   ultrasound. CT CERVICAL SPINE WO CONTRAST   Final Result   1. No acute intracranial abnormality. 2. Mild chronic small vessel ischemic disease. 3. Remote insult in the right temporal lobe. 4. No acute fracture or subluxation within the cervical spine. 5. Right thyroid nodule measures at least 2.2 cm in size. Recommend thyroid   ultrasound. EKG: This EKG is signed and interpreted by me. Rate: 58  Rhythm: Sinus and bradycardia  Interpretation: no acute changes and sinus bradycardia  Comparison: stable as compared to patient's most recent EKG    ------------------------- NURSING NOTES AND VITALS REVIEWED ---------------------------   The nursing notes within the ED encounter and vital signs as below have been reviewed.    BP (!) 113/58   Pulse 69   Temp 97.5 °F (36.4 °C) (Oral)   Resp 18   SpO2 96%   Oxygen Saturation Interpretation: Normal      ---------------------------------------------------PHYSICAL EXAM--------------------------------------      Constitutional/General: Alert and oriented x3, well appearing, non toxic in NAD  Head: NC/AT, atraumatic  Eyes: PERRL, EOMI  Mouth: Oropharynx clear, handling secretions, no trismus  Neck: Supple, full ROM, no meningeal signs  Pulmonary: Lungs clear to auscultation bilaterally, no wheezes, rales, or rhonchi. Not in respiratory distress  Cardiovascular:  Regular rate and rhythm, no murmurs, gallops, or rubs. 2+ distal pulses  Abdomen: Soft, non tender, non distended,   Extremities: Moves all extremities x 4. Warm and well perfused, superficial abrasion noted to the anterior of the left knee. She has no visible swelling or deformity. Pedal pulse are palpable 2+ capillary refill less than 2 seconds. Skin: warm and dry without rash  Neurologic: GCS 15,  Psych: Normal Affect      ------------------------------ ED COURSE/MEDICAL DECISION MAKING----------------------  Medications   acetaminophen (TYLENOL) tablet 1,000 mg (1,000 mg Oral Given 7/21/22 0928)         Medical Decision Making:    CT head and cervical spine are negative x-ray of the left knee is negative. Wound care instructions provided vies to follow-up with PCP. Counseling: The emergency provider has spoken with the patient and caregiver and discussed todays results, in addition to providing specific details for the plan of care and counseling regarding the diagnosis and prognosis. Questions are answered at this time and they are agreeable with the plan.      --------------------------------- IMPRESSION AND DISPOSITION ---------------------------------    IMPRESSION  1. Injury of head, initial encounter    2. Fall, initial encounter    3.  Contusion of left knee, initial encounter        DISPOSITION  Disposition: Discharge to home  Patient condition is good                  MATT Davenport CNP  07/21/22 9620

## 2022-07-22 LAB
EKG ATRIAL RATE: 58 BPM
EKG P AXIS: 52 DEGREES
EKG P-R INTERVAL: 142 MS
EKG Q-T INTERVAL: 424 MS
EKG QRS DURATION: 80 MS
EKG QTC CALCULATION (BAZETT): 416 MS
EKG R AXIS: 20 DEGREES
EKG T AXIS: 80 DEGREES
EKG VENTRICULAR RATE: 58 BPM

## 2022-08-15 ENCOUNTER — OFFICE VISIT (OUTPATIENT)
Dept: ORTHOPEDIC SURGERY | Age: 67
End: 2022-08-15
Payer: MEDICARE

## 2022-08-15 DIAGNOSIS — S52.502A CLOSED FRACTURE OF DISTAL END OF LEFT RADIUS, UNSPECIFIED FRACTURE MORPHOLOGY, INITIAL ENCOUNTER: Primary | ICD-10-CM

## 2022-08-15 PROCEDURE — 1036F TOBACCO NON-USER: CPT | Performed by: ORTHOPAEDIC SURGERY

## 2022-08-15 PROCEDURE — G8428 CUR MEDS NOT DOCUMENT: HCPCS | Performed by: ORTHOPAEDIC SURGERY

## 2022-08-15 PROCEDURE — 3017F COLORECTAL CA SCREEN DOC REV: CPT | Performed by: ORTHOPAEDIC SURGERY

## 2022-08-15 PROCEDURE — G8420 CALC BMI NORM PARAMETERS: HCPCS | Performed by: ORTHOPAEDIC SURGERY

## 2022-08-15 PROCEDURE — 1090F PRES/ABSN URINE INCON ASSESS: CPT | Performed by: ORTHOPAEDIC SURGERY

## 2022-08-15 PROCEDURE — G8400 PT W/DXA NO RESULTS DOC: HCPCS | Performed by: ORTHOPAEDIC SURGERY

## 2022-08-15 PROCEDURE — 99213 OFFICE O/P EST LOW 20 MIN: CPT | Performed by: ORTHOPAEDIC SURGERY

## 2022-08-15 PROCEDURE — 1123F ACP DISCUSS/DSCN MKR DOCD: CPT | Performed by: ORTHOPAEDIC SURGERY

## 2022-08-15 NOTE — PROGRESS NOTES
Follow Up Visit     Loreto Soares returns today for follow up visit regarding her left distal radius fracture treated nonoperatively. Overall she is doing well. REVIEW OF SYSTEMS:     Constitutional:  Negative for weight loss, fevers, chills, fatigue  Cardiovascular: Negative for chest pain, palpitations  Pulmonary: Negative for shortness of breath, labored breathing, cough  GI: negative for abdominal pain, nausea, vomitting   MSK: per HPI  Skin: negative for rash, open wounds    All other systems reviewed and are negative       Physical Exam:     No data recorded    General: Alert and oriented x3, no acute distress  Cardiovascular/pulmonary: No labored breathing, peripheral perfusion intact  Musculoskeletal:    Exam of the wrist today shows no significant swelling. There is minimal tenderness over the fracture site. Motion in small to moderate arcs is tolerable without pain    Controlled Substances Monitoring:      Imaging:  X-rays 3 views of the wrist again show shortening mildly dorsal tilt of the distal radius fracture unchanged from previous films with likely signs of healing. Impression: Maintained alignment of comminuted impacted mildly dorsally tilted distal radius fracture      Assessment: Left distal radius fracture managed nonoperatively, now over 1 month out from her injury      Plan:   She about 5 weeks out from the injury. She has been treated in the cast.  Alignment is maintained. I think she will continue to do well with nonoperative treatment. She was transition to a brace today that she can remove to work on range of motion.   We will see her back in 6 weeks with new images    Hailey Caba MD  Orthopaedic Surgery   8/15/22  1:21 PM

## 2022-08-25 ENCOUNTER — HOSPITAL ENCOUNTER (OUTPATIENT)
Age: 67
Discharge: HOME | End: 2022-08-25
Payer: MEDICARE

## 2022-08-25 LAB
ALBUMIN SERPL-MCNC: 4.2 G/DL (ref 3.5–5.2)
ALP BLD-CCNC: 85 U/L (ref 35–104)
ALT SERPL-CCNC: 13 U/L (ref 0–32)
ANION GAP SERPL CALCULATED.3IONS-SCNC: 12 MMOL/L (ref 7–16)
AST SERPL-CCNC: 17 U/L (ref 0–31)
BASOPHILS ABSOLUTE: 0.04 E9/L (ref 0–0.2)
BASOPHILS RELATIVE PERCENT: 0.7 % (ref 0–2)
BILIRUB SERPL-MCNC: 0.3 MG/DL (ref 0–1.2)
BUN BLDV-MCNC: 12 MG/DL (ref 6–23)
CALCIUM SERPL-MCNC: 9.3 MG/DL (ref 8.6–10.2)
CHLORIDE BLD-SCNC: 105 MMOL/L (ref 98–107)
CHOLESTEROL, TOTAL: 239 MG/DL (ref 0–199)
CO2: 23 MMOL/L (ref 22–29)
CREAT SERPL-MCNC: 0.8 MG/DL (ref 0.5–1)
EOSINOPHILS ABSOLUTE: 0.08 E9/L (ref 0.05–0.5)
EOSINOPHILS RELATIVE PERCENT: 1.5 % (ref 0–6)
GFR AFRICAN AMERICAN: >60
GFR NON-AFRICAN AMERICAN: >60 ML/MIN/1.73
GLUCOSE BLD-MCNC: 128 MG/DL (ref 74–99)
HCT VFR BLD CALC: 38.9 % (ref 34–48)
HDLC SERPL-MCNC: 79 MG/DL
HEMOGLOBIN: 13.2 G/DL (ref 11.5–15.5)
IMMATURE GRANULOCYTES #: 0.02 E9/L
IMMATURE GRANULOCYTES %: 0.4 % (ref 0–5)
LDL CHOLESTEROL CALCULATED: 115 MG/DL (ref 0–99)
LYMPHOCYTES ABSOLUTE: 1.41 E9/L (ref 1.5–4)
LYMPHOCYTES RELATIVE PERCENT: 25.8 % (ref 20–42)
MCH RBC QN AUTO: 31.8 PG (ref 26–35)
MCHC RBC AUTO-ENTMCNC: 33.9 % (ref 32–34.5)
MCV RBC AUTO: 93.7 FL (ref 80–99.9)
MONOCYTES ABSOLUTE: 0.48 E9/L (ref 0.1–0.95)
MONOCYTES RELATIVE PERCENT: 8.8 % (ref 2–12)
NEUTROPHILS ABSOLUTE: 3.44 E9/L (ref 1.8–7.3)
NEUTROPHILS RELATIVE PERCENT: 62.8 % (ref 43–80)
PDW BLD-RTO: 12.5 FL (ref 11.5–15)
PLATELET # BLD: 221 E9/L (ref 130–450)
PMV BLD AUTO: 9.7 FL (ref 7–12)
POTASSIUM SERPL-SCNC: 3.9 MMOL/L (ref 3.5–5)
RBC # BLD: 4.15 E12/L (ref 3.5–5.5)
SODIUM BLD-SCNC: 140 MMOL/L (ref 132–146)
TOTAL PROTEIN: 6.6 G/DL (ref 6.4–8.3)
TRIGL SERPL-MCNC: 227 MG/DL (ref 0–149)
TSH SERPL DL<=0.05 MIU/L-ACNC: 2.35 UIU/ML (ref 0.27–4.2)
VLDLC SERPL CALC-MCNC: 45 MG/DL
WBC # BLD: 5.5 E9/L (ref 4.5–11.5)

## 2022-08-25 PROCEDURE — 80061 LIPID PANEL: CPT

## 2022-08-25 PROCEDURE — 80053 COMPREHEN METABOLIC PANEL: CPT

## 2022-08-25 PROCEDURE — 85025 COMPLETE CBC W/AUTO DIFF WBC: CPT

## 2022-08-25 PROCEDURE — 36415 COLL VENOUS BLD VENIPUNCTURE: CPT

## 2022-08-25 PROCEDURE — 84443 ASSAY THYROID STIM HORMONE: CPT

## 2022-09-14 RX ORDER — TRAZODONE HYDROCHLORIDE 50 MG/1
50 TABLET ORAL NIGHTLY
Qty: 90 TABLET | Refills: 0 | Status: SHIPPED
Start: 2022-09-14 | End: 2022-09-15 | Stop reason: SDUPTHER

## 2022-09-15 RX ORDER — TRAZODONE HYDROCHLORIDE 50 MG/1
50 TABLET ORAL NIGHTLY
Qty: 90 TABLET | Refills: 0 | Status: SHIPPED | OUTPATIENT
Start: 2022-09-15

## 2022-09-21 ENCOUNTER — HOSPITAL ENCOUNTER (OUTPATIENT)
Dept: INFUSION THERAPY | Age: 67
Setting detail: INFUSION SERIES
Discharge: HOME | End: 2022-09-21
Payer: MEDICARE

## 2022-09-21 VITALS
DIASTOLIC BLOOD PRESSURE: 61 MMHG | RESPIRATION RATE: 18 BRPM | WEIGHT: 160 LBS | TEMPERATURE: 96.6 F | BODY MASS INDEX: 29.44 KG/M2 | HEART RATE: 67 BPM | HEIGHT: 62 IN | OXYGEN SATURATION: 99 % | SYSTOLIC BLOOD PRESSURE: 114 MMHG

## 2022-09-21 DIAGNOSIS — M81.0 OSTEOPOROSIS, UNSPECIFIED OSTEOPOROSIS TYPE, UNSPECIFIED PATHOLOGICAL FRACTURE PRESENCE: Primary | ICD-10-CM

## 2022-09-21 PROCEDURE — 6360000002 HC RX W HCPCS: Performed by: INTERNAL MEDICINE

## 2022-09-21 PROCEDURE — 96372 THER/PROPH/DIAG INJ SC/IM: CPT

## 2022-09-21 RX ADMIN — DENOSUMAB 60 MG: 60 INJECTION SUBCUTANEOUS at 15:17

## 2022-09-26 ENCOUNTER — OFFICE VISIT (OUTPATIENT)
Dept: ORTHOPEDIC SURGERY | Age: 67
End: 2022-09-26
Payer: MEDICARE

## 2022-09-26 DIAGNOSIS — S52.502D CLOSED FRACTURE OF DISTAL END OF LEFT RADIUS WITH ROUTINE HEALING, UNSPECIFIED FRACTURE MORPHOLOGY, SUBSEQUENT ENCOUNTER: Primary | ICD-10-CM

## 2022-09-26 PROCEDURE — G8427 DOCREV CUR MEDS BY ELIG CLIN: HCPCS | Performed by: ORTHOPAEDIC SURGERY

## 2022-09-26 PROCEDURE — 99213 OFFICE O/P EST LOW 20 MIN: CPT | Performed by: ORTHOPAEDIC SURGERY

## 2022-09-26 PROCEDURE — 1123F ACP DISCUSS/DSCN MKR DOCD: CPT | Performed by: ORTHOPAEDIC SURGERY

## 2022-09-26 PROCEDURE — 3017F COLORECTAL CA SCREEN DOC REV: CPT | Performed by: ORTHOPAEDIC SURGERY

## 2022-09-26 PROCEDURE — 1036F TOBACCO NON-USER: CPT | Performed by: ORTHOPAEDIC SURGERY

## 2022-09-26 PROCEDURE — G8417 CALC BMI ABV UP PARAM F/U: HCPCS | Performed by: ORTHOPAEDIC SURGERY

## 2022-09-26 PROCEDURE — G8400 PT W/DXA NO RESULTS DOC: HCPCS | Performed by: ORTHOPAEDIC SURGERY

## 2022-09-26 PROCEDURE — 1090F PRES/ABSN URINE INCON ASSESS: CPT | Performed by: ORTHOPAEDIC SURGERY

## 2022-09-26 NOTE — LETTER
[facility]  [address] [place] [address]  Phone: [phone]  Fax: [phone]    Alejandra Loco MD        September 26, 2022     Patient: Olena Macias   YOB: 1955   Date of Visit: 9/26/2022       To Whom It May Concern: It is my medical opinion that Annabell Moritz can return to activities as tolerated including workshop. She can wear her wrist brace if needed, can also discontinue brace if she is not having pain. If you have any questions or concerns, please don't hesitate to call.     Sincerely,        Alejandra Loco MD

## 2022-09-26 NOTE — PROGRESS NOTES
Follow Up Visit     Kris Ernandez returns today for follow up visit regarding her left distal radius fracture treated nonoperatively now 3 months out from her injury. She is doing well overall. She has been in a brace. She has been getting back to normal activities      REVIEW OF SYSTEMS:     Constitutional:  Negative for weight loss, fevers, chills, fatigue  Cardiovascular: Negative for chest pain, palpitations  Pulmonary: Negative for shortness of breath, labored breathing, cough  GI: negative for abdominal pain, nausea, vomitting   MSK: per HPI  Skin: negative for rash, open wounds    All other systems reviewed and are negative       Physical Exam:     No data recorded    General: Alert and oriented x3, no acute distress  Cardiovascular/pulmonary: No labored breathing, peripheral perfusion intact  Musculoskeletal:    Exam of the wrist today shows no tenderness about the wrist.  Range of motion is intact with mild stiffness. There is good  strength. There is intact sensation throughout the hand    Controlled Substances Monitoring:      Imaging:  X-rays today show minimal change with likely interval consolidation of her distal radius fracture which is mildly shortened with mild dorsal tilt. Assessment: Distal radius fracture now over 3 months out managed nonoperatively      Plan:   Doing well. We will continue nonoperative treatment. She can wean herself from her brace as she feels able.   Follow-up as needed    Samantha Tom MD  Orthopaedic Surgery   9/26/22  12:56 PM

## 2022-10-27 ENCOUNTER — OFFICE VISIT (OUTPATIENT)
Dept: NEUROLOGY | Age: 67
End: 2022-10-27
Payer: MEDICARE

## 2022-10-27 VITALS
SYSTOLIC BLOOD PRESSURE: 142 MMHG | BODY MASS INDEX: 29.44 KG/M2 | DIASTOLIC BLOOD PRESSURE: 60 MMHG | HEIGHT: 62 IN | WEIGHT: 160 LBS

## 2022-10-27 DIAGNOSIS — G44.309 POST-TRAUMATIC HEADACHE, NOT INTRACTABLE, UNSPECIFIED CHRONICITY PATTERN: ICD-10-CM

## 2022-10-27 DIAGNOSIS — R42 DIZZINESS AND GIDDINESS: ICD-10-CM

## 2022-10-27 DIAGNOSIS — S06.0X0S CONCUSSION WITHOUT LOSS OF CONSCIOUSNESS, SEQUELA (HCC): ICD-10-CM

## 2022-10-27 DIAGNOSIS — G44.329 CHRONIC POST-TRAUMATIC HEADACHE, NOT INTRACTABLE: ICD-10-CM

## 2022-10-27 DIAGNOSIS — G44.309 POST-CONCUSSION HEADACHE: ICD-10-CM

## 2022-10-27 DIAGNOSIS — G44.209 MUSCLE CONTRACTION HEADACHE: ICD-10-CM

## 2022-10-27 DIAGNOSIS — M47.812 CERVICAL SPONDYLOSIS: Primary | Chronic | ICD-10-CM

## 2022-10-27 PROCEDURE — G8400 PT W/DXA NO RESULTS DOC: HCPCS | Performed by: PSYCHIATRY & NEUROLOGY

## 2022-10-27 PROCEDURE — 1036F TOBACCO NON-USER: CPT | Performed by: PSYCHIATRY & NEUROLOGY

## 2022-10-27 PROCEDURE — G8484 FLU IMMUNIZE NO ADMIN: HCPCS | Performed by: PSYCHIATRY & NEUROLOGY

## 2022-10-27 PROCEDURE — 1090F PRES/ABSN URINE INCON ASSESS: CPT | Performed by: PSYCHIATRY & NEUROLOGY

## 2022-10-27 PROCEDURE — G8427 DOCREV CUR MEDS BY ELIG CLIN: HCPCS | Performed by: PSYCHIATRY & NEUROLOGY

## 2022-10-27 PROCEDURE — G8417 CALC BMI ABV UP PARAM F/U: HCPCS | Performed by: PSYCHIATRY & NEUROLOGY

## 2022-10-27 PROCEDURE — 3017F COLORECTAL CA SCREEN DOC REV: CPT | Performed by: PSYCHIATRY & NEUROLOGY

## 2022-10-27 PROCEDURE — 1123F ACP DISCUSS/DSCN MKR DOCD: CPT | Performed by: PSYCHIATRY & NEUROLOGY

## 2022-10-27 PROCEDURE — 99203 OFFICE O/P NEW LOW 30 MIN: CPT | Performed by: PSYCHIATRY & NEUROLOGY

## 2022-10-27 RX ORDER — DEXTROMETHORPHAN HBR, GUAIFENESIN 20; 200 MG/10ML; MG/10ML
SOLUTION ORAL
COMMUNITY
Start: 2022-09-12

## 2022-10-27 RX ORDER — IBUPROFEN 200 MG
CAPSULE ORAL
COMMUNITY
Start: 2022-09-12

## 2022-10-27 RX ORDER — ACETAMINOPHEN 325 MG/1
650 TABLET ORAL EVERY 6 HOURS PRN
COMMUNITY

## 2022-10-27 RX ORDER — PHENOL 1.4 G/100ML
SPRAY ORAL
COMMUNITY
Start: 2022-09-12

## 2022-10-27 RX ORDER — FLUOXETINE HYDROCHLORIDE 20 MG/1
20 CAPSULE ORAL DAILY
COMMUNITY

## 2022-10-27 RX ORDER — DEXTROMETHORPHAN HYDROBROMIDE, GUAIFENESIN 20; 200 MG/10ML; MG/10ML
SOLUTION ORAL
COMMUNITY
Start: 2022-09-13

## 2022-10-27 NOTE — PROGRESS NOTES
Neurology Consult Note:    Patient: Prem Tan  : 1955  Date: 10/27/22  Referring provider: Parker Greene MD    Referral to Neurology: posttraumatic headache, dizziness, caregiver unsure of duration of symptoms    Dear Parker Greene MD     Thank you for your referral of Prem Tan to the Neurology clinic, an alert, cooperative, mentally-challenged 71-year-old woman who presents with a substitute caregiver for today's appointment regarding referral for headache and intermittent dizziness, with unknown duration of symptoms. Patient is a limited and poor historian. Caregiver cannot provide additional historical information. There is a hx of multiple falls wo/LOC. No hx migraines documented in the past. She reports she struck the rt. Side of her head falling out of bed with mild scalp tenderness. Prn Motrin is being taken for pain which she states does help her head pain. She denies dizziness and lightheadedness, spinning vertigo, tinntus,  hearing loss, nausea, vomiting, double vision or loss of vision. There are no observable tremors. Psychotropic medications included in her medication list are: fluoxetine, olanzapine, oxcarbazepine, trazodone, which may possibly contribute to symptoms of lightheadedness and dizziness. 22, ED visit note reviewed: s/p fall, closed head injury; mechanical fall going sherry steps    Lab Data: From 2022, blood glucose 128, abnormal lipid panel, normal TSH, CBC/differential, absolute lymphocyte count 1.41    Imaging Data: NC head CT, 7and CT of cervical spine 2022:  1. No acute intracranial abnormality. 2. Mild chronic small vessel ischemic disease. 3. Remote insult in the right temporal lobe. 4. No acute fracture or subluxation within the cervical spine. Cervical degenerative disc disease, multilevel.     Current Outpatient Medications   Medication Sig Dispense Refill    FLUoxetine (PROZAC) 20 MG capsule Take 20 mg by mouth daily      acetaminophen (TYLENOL) 325 MG tablet Take 650 mg by mouth every 6 hours as needed for Pain      SORE THROAT 1.4 % LIQD mouth spray       COUGH/CHEST CONGESTION DM  MG/5ML SYRP       TUSSIN -10 MG/5ML syrup       traZODone (DESYREL) 50 MG tablet Take 1 tablet by mouth nightly 90 tablet 0    Cholecalciferol (VITAMIN D3) 50 MCG (2000 UT) CAPS Take 1 capsule by mouth daily      bismuth subsalicylate (PEPTO BISMOL) 262 MG/15ML suspension Take 15 mLs by mouth every 6 hours as needed for Indigestion      medicated lip balm (BLISTEX/CARMEX) 2-2.5-6.6 % STCK Apply topically as needed for Dry Lips      Ibuprofen (ADVIL PO) Take by mouth      magnesium hydroxide (MILK OF MAGNESIA CONCENTRATE) 2400 MG/10ML SUSP Take 2,400 mg by mouth once as needed      OXcarbazepine (TRILEPTAL) 150 MG tablet Take 1 tablet by mouth 2 times daily for 14 days (Patient taking differently: Take 600 mg by mouth daily) 28 tablet 0    OLANZapine (ZYPREXA) 5 MG tablet Take 1 tablet by mouth nightly for 14 days (Patient taking differently: Take 15 mg by mouth nightly) 14 tablet 0    Omega-3 Fatty Acids (FISH OIL) 1000 MG CAPS Take 1,000 mg by mouth 3 times daily      levothyroxine (SYNTHROID) 50 MCG tablet 50 mcg Daily      denosumab (PROLIA) 60 MG/ML SOLN SC injection Inject 60 mg into the skin once Every 6 months at [facility]      Multiple Vitamins-Minerals (THERAPEUTIC MULTIVITAMIN-MINERALS) tablet Take 1 tablet by mouth daily. Elastic Bandages & Supports (JOBST KNEE HIGH COMPRESSION SM) MISC Compression stockings 20-30 mm hg thigh high bilaterally  Dx : Venous Insufficiency, Swelling  Please provide scooby 2 each 2    neomycin-bacitracin-polymyxin (NEOSPORIN) 3.5-400-5000 OINT ointment  (Patient not taking: Reported on 10/27/2022)      ketoconazole (NIZORAL) 2 % shampoo Apply topically daily Apply topically daily as needed.  (Patient not taking: Reported on 10/27/2022)       No current facility-administered medications for this visit.        No Known Allergies    Patient Active Problem List   Diagnosis    Acute blood loss anemia    Hemorrhage of varicose veins of lower extremity    Osteoporosis    Displaced fracture of shaft of left clavicle, initial encounter for closed fracture    Psychosis NOS (HCC)    Intellectual disability    Mood disorder (HCC)    Senile osteoporosis    Cervical spondylosis    Dizziness and giddiness    Post-concussion headache    Muscle contraction headache       Past Medical History:   Diagnosis Date    Anemia     Cervical spondylosis 10/27/2022    Chronic post-traumatic headache 10/27/2022    Clavicle fracture     Dizziness and giddiness 10/27/2022    DJD (degenerative joint disease)     Helicobacter pylori infection 12/2015    Mental disability     Muscle contraction headache 10/27/2022    Osteopenia     gets yearly injections    Pneumonia     Post-concussion headache 10/27/2022    Thyroid disease     Varicose veins        Past Surgical History:   Procedure Laterality Date    COLONOSCOPY  2015    CYST REMOVAL      (R) wrist    ENDOSCOPY, COLON, DIAGNOSTIC      HEEL SPUR SURGERY      (L) heel    THYROIDECTOMY, COMPLETION      TONSILLECTOMY         Family History   Problem Relation Age of Onset    Kidney Disease Mother     Stroke Mother     Diabetes Mother     Heart Failure Mother        Social History     Socioeconomic History    Marital status: Single     Spouse name: Not on file    Number of children: 0    Years of education: 12    Highest education level: Not on file   Occupational History    Not on file   Tobacco Use    Smoking status: Never    Smokeless tobacco: Never   Vaping Use    Vaping Use: Never used   Substance and Sexual Activity    Alcohol use: Not Currently     Alcohol/week: 0.0 standard drinks    Drug use: No    Sexual activity: Never   Other Topics Concern    Not on file   Social History Narrative    Not on file     Social Determinants of Health     Financial Resource Strain: Not on file   Food Insecurity: Not on file   Transportation Needs: Not on file   Physical Activity: Not on file   Stress: Not on file   Social Connections: Not on file   Intimate Partner Violence: Not on file   Housing Stability: Not on file     Review of Systems   Unable to perform ROS: Psychiatric disorder   Skin: Negative. Neurological:  Positive for dizziness, light-headedness and headaches. Hematological: Negative. Psychiatric/Behavioral:  Positive for decreased concentration. The patient is nervous/anxious. Affective disorder, mentally-challenged     Neurologic Exam:  BP (!) 142/60 (Site: Right Upper Arm, Position: Sitting, Cuff Size: Medium Adult)   Ht 5' 2\" (1.575 m)   Wt 160 lb (72.6 kg)   BMI 29.26 kg/m²   General appearance: Alert, cooperative, mildly anxious, well-nourished, well-groomed, seated next to a caregiver, no acute distress  HEENT: Normocephalic/atraumatic. Neck: Supple  Cardiac: RRR  Respiratory: grossly clear  Extremities: No edema, erythema or cyanosis  Skin: No apparent lesions or rashes  Musculoskeletal: No fasciculations or tremors  Mental Status: Alert, oriented x2  Speech/Language: Clear, grossly fluent, paucity of speech, decreased spontaneity speech and language production. Attention span/Concentration: Mildly reduced  Affect/Mood: Flat affect, mildly anxious  Insight/Judgement: Unable to accurately assess     Fund of Knowledge/Current events: Limited  CN II-XII:     Pupils: Equal, reactive to light, 2.0 mm     EOM's: Full without nystagmus  Visual Fields: Grossly full to confrontation  Fundi: Miosis to light  CN V: normal V1-V3  CN VII: No facial droop  CN VIII: Hearing grossly intact  CN IX-XII: Tongue midline  SCM/Trapezii: 5/5 power  Motor: 5/5 power, effort-related in the upper and lower extremities without tremor or drift and normal motor tone. Grossly intact fine motor function of both hands. Left upper extremity splint, wrist/forearm.   DTR's: 1+ in the upper extremities, 1+ in lower extremities, no ankle clonus, plantar responses are flexor. Sensory: Reports grossly intact subjective sensation to light touch and sharp stick testing. Coordination/Gait: No gross limb dysmetria on finger-to-nose testing, no truncal or cerebellar gait ataxia. No intention tremors. Assessment/Plan:  1. Probable resolving mild postconcussive syndrome, post-concussive tension-type  headache related to history of falls and minor closed head injury without loss of consciousness. Scalp pain and tenderness from her closed head injury is alleviated by as needed Motrin. Not complaining of dizziness or lightheadedness. 2.  Mentally-challenged, chronic affective disorder. 3.  Patient appears neurologically intact at cognitive level of baseline functioning. There are no additional recommendations from a neurologic perspective. Sincerely,      Ajay Raymundo MD    Neurology & Clinical Neurophysiology    This note was created using speech recognition transcription software. Despite proofreading, there may be several typographical errors present that may affect the meaning of the content. Please call with any questions. A total time of 40 mins. was spent on the date of service in preparation for this visit, which included face-to-face patient care, completing clinical documentation, counseling and coordination of care based on clinical impression, neurologic diagnosis, review of pertinent imaging studies, test results, implementation and discussion of treatment plan, risk factor reduction and patient and/or family education.

## 2022-11-08 ENCOUNTER — TELEPHONE (OUTPATIENT)
Dept: PRIMARY CARE CLINIC | Age: 67
End: 2022-11-08

## 2022-11-08 NOTE — TELEPHONE ENCOUNTER
Jovi Solon called and wanted Dr. Shahnaz Atkins to write a script for compression socks and fax it over to   [phone]

## 2022-11-14 ENCOUNTER — TELEPHONE (OUTPATIENT)
Dept: PRIMARY CARE CLINIC | Age: 67
End: 2022-11-14

## 2022-11-14 NOTE — TELEPHONE ENCOUNTER
Received a call from Adama Awan at [facility] requesting a old or a new prescription for compression stockings juan diego Meekia Quinton. She can be reached by phone at [phone] or faxed is [phone]. Thank you.

## 2022-11-15 ENCOUNTER — OFFICE VISIT (OUTPATIENT)
Dept: PRIMARY CARE CLINIC | Age: 67
End: 2022-11-15
Payer: MEDICARE

## 2022-11-15 VITALS
BODY MASS INDEX: 30.16 KG/M2 | HEIGHT: 65 IN | DIASTOLIC BLOOD PRESSURE: 82 MMHG | WEIGHT: 181 LBS | SYSTOLIC BLOOD PRESSURE: 118 MMHG

## 2022-11-15 DIAGNOSIS — F29 PSYCHOSIS, UNSPECIFIED PSYCHOSIS TYPE (HCC): ICD-10-CM

## 2022-11-15 DIAGNOSIS — Z91.81 AT HIGH RISK FOR FALLS: ICD-10-CM

## 2022-11-15 DIAGNOSIS — F29 UNSPECIFIED PSYCHOSIS NOT DUE TO A SUBSTANCE OR KNOWN PHYSIOLOGICAL CONDITION (HCC): ICD-10-CM

## 2022-11-15 DIAGNOSIS — F39 MOOD DISORDER (HCC): Primary | ICD-10-CM

## 2022-11-15 DIAGNOSIS — F79 INTELLECTUAL DISABILITY: ICD-10-CM

## 2022-11-15 PROCEDURE — 99213 OFFICE O/P EST LOW 20 MIN: CPT | Performed by: INTERNAL MEDICINE

## 2022-11-15 PROCEDURE — 3017F COLORECTAL CA SCREEN DOC REV: CPT | Performed by: INTERNAL MEDICINE

## 2022-11-15 PROCEDURE — G8427 DOCREV CUR MEDS BY ELIG CLIN: HCPCS | Performed by: INTERNAL MEDICINE

## 2022-11-15 PROCEDURE — 1036F TOBACCO NON-USER: CPT | Performed by: INTERNAL MEDICINE

## 2022-11-15 PROCEDURE — G8400 PT W/DXA NO RESULTS DOC: HCPCS | Performed by: INTERNAL MEDICINE

## 2022-11-15 PROCEDURE — 1090F PRES/ABSN URINE INCON ASSESS: CPT | Performed by: INTERNAL MEDICINE

## 2022-11-15 PROCEDURE — G8417 CALC BMI ABV UP PARAM F/U: HCPCS | Performed by: INTERNAL MEDICINE

## 2022-11-15 PROCEDURE — 1123F ACP DISCUSS/DSCN MKR DOCD: CPT | Performed by: INTERNAL MEDICINE

## 2022-11-15 PROCEDURE — G8484 FLU IMMUNIZE NO ADMIN: HCPCS | Performed by: INTERNAL MEDICINE

## 2022-11-15 SDOH — ECONOMIC STABILITY: FOOD INSECURITY: WITHIN THE PAST 12 MONTHS, YOU WORRIED THAT YOUR FOOD WOULD RUN OUT BEFORE YOU GOT MONEY TO BUY MORE.: NEVER TRUE

## 2022-11-15 SDOH — ECONOMIC STABILITY: FOOD INSECURITY: WITHIN THE PAST 12 MONTHS, THE FOOD YOU BOUGHT JUST DIDN'T LAST AND YOU DIDN'T HAVE MONEY TO GET MORE.: NEVER TRUE

## 2022-11-15 ASSESSMENT — PATIENT HEALTH QUESTIONNAIRE - PHQ9
SUM OF ALL RESPONSES TO PHQ QUESTIONS 1-9: 0
2. FEELING DOWN, DEPRESSED OR HOPELESS: 0
1. LITTLE INTEREST OR PLEASURE IN DOING THINGS: 0
SUM OF ALL RESPONSES TO PHQ QUESTIONS 1-9: 0
SUM OF ALL RESPONSES TO PHQ9 QUESTIONS 1 & 2: 0

## 2022-11-15 ASSESSMENT — SOCIAL DETERMINANTS OF HEALTH (SDOH): HOW HARD IS IT FOR YOU TO PAY FOR THE VERY BASICS LIKE FOOD, HOUSING, MEDICAL CARE, AND HEATING?: NOT HARD AT ALL

## 2022-11-15 NOTE — PROGRESS NOTES
Sagrario Godoy is a 79 y.o. female with the following history of MR DJD for check up doing fine     Past Medical History:   Diagnosis Date    Anemia     Cervical spondylosis 10/27/2022    Chronic post-traumatic headache 10/27/2022    Clavicle fracture     Dizziness and giddiness 10/27/2022    DJD (degenerative joint disease)     Helicobacter pylori infection 12/2015    Mental disability     Muscle contraction headache 10/27/2022    Osteopenia     gets yearly injections    Pneumonia     Post-concussion headache 10/27/2022    Thyroid disease     Varicose veins        Past Surgical History:   Procedure Laterality Date    COLONOSCOPY  2015    CYST REMOVAL      (R) wrist    ENDOSCOPY, COLON, DIAGNOSTIC      HEEL SPUR SURGERY      (L) heel    THYROIDECTOMY, COMPLETION      TONSILLECTOMY         Family History   Problem Relation Age of Onset    Kidney Disease Mother     Stroke Mother     Diabetes Mother     Heart Failure Mother              Current Outpatient Medications:     FLUoxetine (PROZAC) 20 MG capsule, Take 20 mg by mouth daily, Disp: , Rfl:     acetaminophen (TYLENOL) 325 MG tablet, Take 650 mg by mouth every 6 hours as needed for Pain, Disp: , Rfl:     SORE THROAT 1.4 % LIQD mouth spray, , Disp: , Rfl:     neomycin-bacitracin-polymyxin (NEOSPORIN) 3.5-400-5000 OINT ointment, , Disp: , Rfl:     COUGH/CHEST CONGESTION DM  MG/5ML SYRP, , Disp: , Rfl:     TUSSIN -10 MG/5ML syrup, , Disp: , Rfl:     traZODone (DESYREL) 50 MG tablet, Take 1 tablet by mouth nightly, Disp: 90 tablet, Rfl: 0    Cholecalciferol (VITAMIN D3) 50 MCG (2000 UT) CAPS, Take 1 capsule by mouth daily, Disp: , Rfl:     bismuth subsalicylate (PEPTO BISMOL) 262 MG/15ML suspension, Take 15 mLs by mouth every 6 hours as needed for Indigestion, Disp: , Rfl:     medicated lip balm (BLISTEX/CARMEX) 2-2.5-6.6 % STCK, Apply topically as needed for Dry Lips, Disp: , Rfl:     Ibuprofen (ADVIL PO), Take by mouth, Disp: , Rfl:     magnesium hydroxide (MILK OF MAGNESIA CONCENTRATE) 2400 MG/10ML SUSP, Take 2,400 mg by mouth once as needed, Disp: , Rfl:     Omega-3 Fatty Acids (FISH OIL) 1000 MG CAPS, Take 1,000 mg by mouth 3 times daily, Disp: , Rfl:     denosumab (PROLIA) 60 MG/ML SOLN SC injection, Inject 60 mg into the skin once Every 6 months at [facility], Disp: , Rfl:     Multiple Vitamins-Minerals (THERAPEUTIC MULTIVITAMIN-MINERALS) tablet, Take 1 tablet by mouth daily. , Disp: , Rfl:     Elastic Bandages & Supports (JOBST KNEE HIGH COMPRESSION SM) MISC, Compression stockings 20-30 mm hg thigh high bilaterally Dx : Venous Insufficiency, Swelling Please provide scooby, Disp: 2 each, Rfl: 2    OXcarbazepine (TRILEPTAL) 150 MG tablet, Take 1 tablet by mouth 2 times daily for 14 days (Patient taking differently: Take 600 mg by mouth daily), Disp: 28 tablet, Rfl: 0    OLANZapine (ZYPREXA) 5 MG tablet, Take 1 tablet by mouth nightly for 14 days (Patient taking differently: Take 15 mg by mouth nightly), Disp: 14 tablet, Rfl: 0    ketoconazole (NIZORAL) 2 % shampoo, Apply topically daily Apply topically daily as needed. (Patient not taking: Reported on 10/27/2022), Disp: , Rfl:     levothyroxine (SYNTHROID) 50 MCG tablet, 50 mcg Daily, Disp: , Rfl:      Allergies: Patient has no known allergies. Social History     Tobacco Use    Smoking status: Never    Smokeless tobacco: Never   Substance Use Topics    Alcohol use: Not Currently     Alcohol/week: 0.0 standard drinks        Review of Systems:  Respiratory: pos.  for cough no hemoptysis  Cardiovascular: negative for chest pain and dyspnea  Gastrointestinal: negative for abdominal pain, diarrhea, nausea and vomiting  Genitourinary:negative for dysuria and hematuria  Derm: negative for rash and skin lesion(s)  Neurological: negative for seizures and tremors  Endocrine: negative for diabetic symptoms including polydipsia and polyuria    Physical Exam:  Vitals:    11/15/22 0913   BP: 118/82      Wt Readings from Last 3 Encounters:   11/15/22 181 lb (82.1 kg)   10/27/22 160 lb (72.6 kg)   09/21/22 160 lb (72.6 kg)       Skin:  Warm and dry. No rash or bruises  HEENT:  PERRLA, EOMI  Neck:  No JVD, No thyromegaly, No carotid bruit  Cardiac:  RRR, No gallop or murmur  Lungs:  CTA, Normal percussion  Abdomen: Normal bowel sounds, no HSM, non-tender  Extremities:  No clubbing, edema or cyanosis  Neurological:  Moves all extremities. Lab Results   Component Value Date     08/25/2022    K 3.9 08/25/2022     08/25/2022    CO2 23 08/25/2022    BUN 12 08/25/2022    CREATININE 0.8 08/25/2022    GLUCOSE 128 (H) 08/25/2022    CALCIUM 9.3 08/25/2022    PROT 6.6 08/25/2022    LABALBU 4.2 08/25/2022    BILITOT 0.3 08/25/2022    ALKPHOS 85 08/25/2022    AST 17 08/25/2022    ALT 13 08/25/2022    LABGLOM >60 08/25/2022    GFRAA >60 08/25/2022     Lab Results   Component Value Date    WBC 5.5 08/25/2022    HGB 13.2 08/25/2022    HCT 38.9 08/25/2022    MCV 93.7 08/25/2022     08/25/2022     Lab Results   Component Value Date    CHOL 239 (H) 08/25/2022    TRIG 227 (H) 08/25/2022    HDL 79 08/25/2022    LDLCALC 115 (H) 08/25/2022    LABVLDL 45 08/25/2022           Assessment and Plan:    Patient Active Problem List   Diagnosis    Acute blood loss anemia    Hemorrhage of varicose veins of lower extremity    Osteoporosis    Displaced fracture of shaft of left clavicle, initial encounter for closed fracture    Psychosis NOS (HCC)    Intellectual disability    Mood disorder (HCC)    Senile osteoporosis    Cervical spondylosis    Dizziness and giddiness    Post-concussion headache    Muscle contraction headache       Diagnosis/Orders  1. Mood disorder ([address]  2. At high risk for falls  3. Unspecified psychosis not due to a substance or known physiological condition ([address]  4. Psychosis, unspecified psychosis type ([facility]  5. Intellectual disability    Return if symptoms worsen or fail to improve.       Riley Barboza,

## 2022-11-23 ENCOUNTER — TELEPHONE (OUTPATIENT)
Dept: PRIMARY CARE CLINIC | Age: 67
End: 2022-11-23

## 2022-11-23 NOTE — TELEPHONE ENCOUNTER
[facility] [employer] called for the third time trying to get a script faxed to them from Dr. Seb Mitchell for john gonzales.      [phone]

## 2023-01-27 ENCOUNTER — COMMUNITY OUTREACH (OUTPATIENT)
Dept: PRIMARY CARE CLINIC | Age: 68
End: 2023-01-27

## 2023-01-27 NOTE — PROGRESS NOTES
Patient's HM shows they are overdue for Colonoscopy   CareEverywhere and  files  searched with success.

## 2023-03-08 ENCOUNTER — TELEPHONE (OUTPATIENT)
Dept: PRIMARY CARE CLINIC | Age: 68
End: 2023-03-08

## 2023-03-08 DIAGNOSIS — E83.51 LOW CALCIUM LEVELS: Primary | ICD-10-CM

## 2023-03-09 NOTE — TELEPHONE ENCOUNTER
Looking back into the pt chart it looks like she had a prolia injection back in September 9/21/2022 they scheduled her for another one for 03/21/23     A RN called saying they need a new Calcium level draw script due to the old one expiring

## 2023-03-13 NOTE — TELEPHONE ENCOUNTER
Infusion center called stating that the pt needs a Calcium draw before she gets a infusion.  The one they have  Patient called regarding a previous fall. Note on this 3/12. Patient called inquiring about pain medication for the fall. This writer referred patient to contact their PCP about pain medications. Patient educated that the cardiology team cannot prescribe pain medications for issues unrelated to our cardiology care. Patient verbalized understanding and will reach back out to their PCP.

## 2023-03-15 ENCOUNTER — HOSPITAL ENCOUNTER (OUTPATIENT)
Age: 68
Discharge: HOME | End: 2023-03-15
Payer: MEDICARE

## 2023-03-15 LAB
CHOLESTEROL, FASTING: 260 MG/DL (ref 0–199)
HBA1C MFR BLD: 4.9 % (ref 4–5.6)
HDLC SERPL-MCNC: 73 MG/DL
LDL CHOLESTEROL CALCULATED: 124 MG/DL (ref 0–99)
TRIGLYCERIDE, FASTING: 316 MG/DL (ref 0–149)
VLDLC SERPL CALC-MCNC: 63 MG/DL

## 2023-03-15 PROCEDURE — 83036 HEMOGLOBIN GLYCOSYLATED A1C: CPT

## 2023-03-15 PROCEDURE — 80061 LIPID PANEL: CPT

## 2023-03-15 PROCEDURE — 36415 COLL VENOUS BLD VENIPUNCTURE: CPT

## 2023-03-20 ENCOUNTER — HOSPITAL ENCOUNTER (OUTPATIENT)
Age: 68
Discharge: HOME | End: 2023-03-20
Payer: MEDICARE

## 2023-03-20 DIAGNOSIS — E83.51 LOW CALCIUM LEVELS: ICD-10-CM

## 2023-03-20 LAB
ALBUMIN SERPL-MCNC: 4.2 G/DL (ref 3.5–5.2)
ALP SERPL-CCNC: 98 U/L (ref 35–104)
ALT SERPL-CCNC: 15 U/L (ref 0–32)
ANION GAP SERPL CALCULATED.3IONS-SCNC: 10 MMOL/L (ref 7–16)
AST SERPL-CCNC: 20 U/L (ref 0–31)
BILIRUB SERPL-MCNC: <0.2 MG/DL (ref 0–1.2)
BUN SERPL-MCNC: 17 MG/DL (ref 6–23)
CALCIUM SERPL-MCNC: 9.3 MG/DL (ref 8.6–10.2)
CHLORIDE SERPL-SCNC: 110 MMOL/L (ref 98–107)
CO2 SERPL-SCNC: 28 MMOL/L (ref 22–29)
CREAT SERPL-MCNC: 0.7 MG/DL (ref 0.5–1)
GLUCOSE SERPL-MCNC: 78 MG/DL (ref 74–99)
POTASSIUM SERPL-SCNC: 3.8 MMOL/L (ref 3.5–5)
PROT SERPL-MCNC: 7.1 G/DL (ref 6.4–8.3)
SODIUM SERPL-SCNC: 148 MMOL/L (ref 132–146)

## 2023-03-20 PROCEDURE — 36415 COLL VENOUS BLD VENIPUNCTURE: CPT

## 2023-03-20 PROCEDURE — 80053 COMPREHEN METABOLIC PANEL: CPT

## 2023-03-21 ENCOUNTER — HOSPITAL ENCOUNTER (OUTPATIENT)
Dept: INFUSION THERAPY | Age: 68
Setting detail: INFUSION SERIES
End: 2023-03-21

## 2023-03-31 ENCOUNTER — HOSPITAL ENCOUNTER (OUTPATIENT)
Dept: INFUSION THERAPY | Age: 68
Setting detail: INFUSION SERIES
Discharge: HOME | End: 2023-03-31
Payer: MEDICARE

## 2023-03-31 VITALS
RESPIRATION RATE: 18 BRPM | SYSTOLIC BLOOD PRESSURE: 171 MMHG | HEART RATE: 69 BPM | HEIGHT: 62 IN | BODY MASS INDEX: 31.28 KG/M2 | DIASTOLIC BLOOD PRESSURE: 70 MMHG | OXYGEN SATURATION: 97 % | TEMPERATURE: 96.5 F | WEIGHT: 170 LBS

## 2023-03-31 DIAGNOSIS — M81.0 OSTEOPOROSIS, UNSPECIFIED OSTEOPOROSIS TYPE, UNSPECIFIED PATHOLOGICAL FRACTURE PRESENCE: Primary | ICD-10-CM

## 2023-03-31 PROCEDURE — 6360000002 HC RX W HCPCS: Performed by: INTERNAL MEDICINE

## 2023-03-31 PROCEDURE — 96372 THER/PROPH/DIAG INJ SC/IM: CPT

## 2023-03-31 RX ADMIN — DENOSUMAB 60 MG: 60 INJECTION SUBCUTANEOUS at 15:20

## 2023-04-07 RX ORDER — LEVOTHYROXINE SODIUM 0.05 MG/1
50 TABLET ORAL DAILY
Qty: 30 TABLET | Refills: 2 | Status: SHIPPED | OUTPATIENT
Start: 2023-04-07

## 2023-04-24 NOTE — TELEPHONE ENCOUNTER
Last Appointment:  11/15/2022  Future Appointments   Date Time Provider Ashly Lira   4/26/2023  9:30 AM Riley Barboza MD [facility]   10/2/2023  1:00 PM [facility] RM [facility]

## 2023-04-25 RX ORDER — LEVOTHYROXINE SODIUM 0.05 MG/1
50 TABLET ORAL DAILY
Qty: 30 TABLET | Refills: 0 | Status: CANCELLED | OUTPATIENT
Start: 2023-04-25

## 2023-04-25 RX ORDER — LEVOTHYROXINE SODIUM 0.05 MG/1
50 TABLET ORAL DAILY
Qty: 30 TABLET | Refills: 0 | Status: SHIPPED
Start: 2023-04-25 | End: 2023-04-26 | Stop reason: SDUPTHER

## 2023-04-26 ENCOUNTER — OFFICE VISIT (OUTPATIENT)
Dept: PRIMARY CARE CLINIC | Age: 68
End: 2023-04-26

## 2023-04-26 VITALS
OXYGEN SATURATION: 98 % | SYSTOLIC BLOOD PRESSURE: 130 MMHG | DIASTOLIC BLOOD PRESSURE: 84 MMHG | HEART RATE: 85 BPM | WEIGHT: 196 LBS | BODY MASS INDEX: 36.07 KG/M2 | HEIGHT: 62 IN | TEMPERATURE: 97.4 F | RESPIRATION RATE: 16 BRPM

## 2023-04-26 DIAGNOSIS — F79 INTELLECTUAL DISABILITY: Primary | ICD-10-CM

## 2023-04-26 DIAGNOSIS — M81.0 OSTEOPOROSIS, UNSPECIFIED OSTEOPOROSIS TYPE, UNSPECIFIED PATHOLOGICAL FRACTURE PRESENCE: ICD-10-CM

## 2023-04-26 DIAGNOSIS — E03.9 ACQUIRED HYPOTHYROIDISM: ICD-10-CM

## 2023-04-26 DIAGNOSIS — F29 PSYCHOSIS, UNSPECIFIED PSYCHOSIS TYPE (HCC): ICD-10-CM

## 2023-04-26 RX ORDER — LEVOTHYROXINE SODIUM 0.05 MG/1
50 TABLET ORAL DAILY
Qty: 90 TABLET | Refills: 0 | Status: SHIPPED | OUTPATIENT
Start: 2023-04-26

## 2023-04-26 SDOH — ECONOMIC STABILITY: INCOME INSECURITY: HOW HARD IS IT FOR YOU TO PAY FOR THE VERY BASICS LIKE FOOD, HOUSING, MEDICAL CARE, AND HEATING?: NOT HARD AT ALL

## 2023-04-26 SDOH — ECONOMIC STABILITY: HOUSING INSECURITY
IN THE LAST 12 MONTHS, WAS THERE A TIME WHEN YOU DID NOT HAVE A STEADY PLACE TO SLEEP OR SLEPT IN A SHELTER (INCLUDING NOW)?: NO

## 2023-04-26 SDOH — ECONOMIC STABILITY: FOOD INSECURITY: WITHIN THE PAST 12 MONTHS, THE FOOD YOU BOUGHT JUST DIDN'T LAST AND YOU DIDN'T HAVE MONEY TO GET MORE.: NEVER TRUE

## 2023-04-26 SDOH — ECONOMIC STABILITY: FOOD INSECURITY: WITHIN THE PAST 12 MONTHS, YOU WORRIED THAT YOUR FOOD WOULD RUN OUT BEFORE YOU GOT MONEY TO BUY MORE.: NEVER TRUE

## 2023-04-26 ASSESSMENT — PATIENT HEALTH QUESTIONNAIRE - PHQ9
SUM OF ALL RESPONSES TO PHQ QUESTIONS 1-9: 0
1. LITTLE INTEREST OR PLEASURE IN DOING THINGS: 0
SUM OF ALL RESPONSES TO PHQ QUESTIONS 1-9: 0
SUM OF ALL RESPONSES TO PHQ QUESTIONS 1-9: 0
2. FEELING DOWN, DEPRESSED OR HOPELESS: 0
SUM OF ALL RESPONSES TO PHQ QUESTIONS 1-9: 0
SUM OF ALL RESPONSES TO PHQ9 QUESTIONS 1 & 2: 0

## 2023-04-26 NOTE — PROGRESS NOTES
2-2.5-6.6 % STCK, Apply topically as needed for Dry Lips, Disp: , Rfl:     Ibuprofen (ADVIL PO), Take by mouth, Disp: , Rfl:     magnesium hydroxide (MILK OF MAGNESIA CONCENTRATE) 2400 MG/10ML SUSP, Take 10 mLs by mouth once as needed, Disp: , Rfl:     Omega-3 Fatty Acids (FISH OIL) 1000 MG CAPS, Take 1 capsule by mouth 3 times daily, Disp: , Rfl:     denosumab (PROLIA) 60 MG/ML SOLN SC injection, Inject 1 mL into the skin once Every 6 months at [address] [facility], Disp: , Rfl:     Multiple Vitamins-Minerals (THERAPEUTIC MULTIVITAMIN-MINERALS) tablet, Take 1 tablet by mouth daily, Disp: , Rfl:     Elastic Bandages & Supports (JOBST KNEE HIGH COMPRESSION SM) MISC, Compression stockings 20-30 mm hg thigh high bilaterally Dx : Venous Insufficiency, Swelling Please provide scooby, Disp: 2 each, Rfl: 2    OXcarbazepine (TRILEPTAL) 150 MG tablet, Take 1 tablet by mouth 2 times daily for 14 days (Patient taking differently: Take 600 mg by mouth daily), Disp: 28 tablet, Rfl: 0    OLANZapine (ZYPREXA) 5 MG tablet, Take 1 tablet by mouth nightly for 14 days (Patient taking differently: Take 15 mg by mouth nightly), Disp: 14 tablet, Rfl: 0    ketoconazole (NIZORAL) 2 % shampoo, Apply topically daily Apply topically daily as needed. (Patient not taking: Reported on 10/27/2022), Disp: , Rfl:      Allergies: Patient has no known allergies.     Social History     Tobacco Use    Smoking status: Never    Smokeless tobacco: Never   Substance Use Topics    Alcohol use: Not Currently     Alcohol/week: 0.0 standard drinks        Review of Systems:  Respiratory: negative for cough and hemoptysis  Cardiovascular: negative for chest pain and dyspnea  Gastrointestinal: negative for abdominal pain, diarrhea, nausea and vomiting  Genitourinary:negative for dysuria and hematuria  Derm: negative for rash and skin lesion(s)  Neurological: negative for seizures and tremors  Endocrine: negative for diabetic symptoms including polydipsia and

## 2023-05-02 ENCOUNTER — TELEPHONE (OUTPATIENT)
Dept: PRIMARY CARE CLINIC | Age: 68
End: 2023-05-02

## 2023-05-02 DIAGNOSIS — F79 INTELLECTUAL DISABILITY: Primary | ICD-10-CM

## 2023-05-09 ENCOUNTER — HOSPITAL ENCOUNTER (EMERGENCY)
Age: 68
Discharge: HOME | End: 2023-05-09
Attending: STUDENT IN AN ORGANIZED HEALTH CARE EDUCATION/TRAINING PROGRAM
Payer: MEDICARE

## 2023-05-09 VITALS
DIASTOLIC BLOOD PRESSURE: 73 MMHG | OXYGEN SATURATION: 95 % | BODY MASS INDEX: 35.85 KG/M2 | WEIGHT: 196 LBS | RESPIRATION RATE: 16 BRPM | TEMPERATURE: 98.4 F | HEART RATE: 59 BPM | SYSTOLIC BLOOD PRESSURE: 162 MMHG
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
## 2023-05-09 DIAGNOSIS — S09.90XA CLOSED HEAD INJURY, INITIAL ENCOUNTER: Primary | ICD-10-CM

## 2023-05-09 PROCEDURE — 6370000000 HC RX 637 (ALT 250 FOR IP): Performed by: STUDENT IN AN ORGANIZED HEALTH CARE EDUCATION/TRAINING PROGRAM

## 2023-05-09 PROCEDURE — 99283 EMERGENCY DEPT VISIT LOW MDM: CPT

## 2023-05-09 RX ORDER — ACETAMINOPHEN 325 MG/1
650 TABLET ORAL ONCE
Status: COMPLETED | OUTPATIENT
Start: 2023-05-09 | End: 2023-05-09

## 2023-05-09 RX ADMIN — ACETAMINOPHEN 650 MG: 325 TABLET ORAL at 12:59

## 2023-05-09 ASSESSMENT — PAIN DESCRIPTION - FREQUENCY: FREQUENCY: CONTINUOUS

## 2023-05-09 ASSESSMENT — PAIN DESCRIPTION - LOCATION
LOCATION: HEAD
LOCATION: HEAD

## 2023-05-09 ASSESSMENT — PAIN DESCRIPTION - DESCRIPTORS
DESCRIPTORS: DISCOMFORT
DESCRIPTORS: DISCOMFORT;ACHING

## 2023-05-09 ASSESSMENT — PAIN DESCRIPTION - ORIENTATION
ORIENTATION: RIGHT;ANTERIOR
ORIENTATION: RIGHT

## 2023-05-09 ASSESSMENT — PAIN SCALES - GENERAL
PAINLEVEL_OUTOF10: 5
PAINLEVEL_OUTOF10: 3

## 2023-05-09 ASSESSMENT — PAIN DESCRIPTION - ONSET: ONSET: ON-GOING

## 2023-05-09 ASSESSMENT — LIFESTYLE VARIABLES
HOW OFTEN DO YOU HAVE A DRINK CONTAINING ALCOHOL: NEVER
HOW MANY STANDARD DRINKS CONTAINING ALCOHOL DO YOU HAVE ON A TYPICAL DAY: PATIENT DOES NOT DRINK

## 2023-05-09 ASSESSMENT — PAIN DESCRIPTION - PAIN TYPE: TYPE: ACUTE PAIN

## 2023-05-09 ASSESSMENT — PAIN - FUNCTIONAL ASSESSMENT
PAIN_FUNCTIONAL_ASSESSMENT: PREVENTS OR INTERFERES SOME ACTIVE ACTIVITIES AND ADLS
PAIN_FUNCTIONAL_ASSESSMENT: 0-10

## 2023-05-09 NOTE — ED PROVIDER NOTES
[facility] Emergency [address] [place] [address]  [phone]  Go to   If symptoms worsen        Final Impression:   1. Closed head injury, initial encounter    2. Fall, initial encounter          (Please note that portions of this note were completed with a voice recognition program.  Efforts were made to edit the dictations but occasionally words are mis-transcribed. Beth Sepulveda DO  05/09/23 [id number]

## 2023-05-09 NOTE — ED NOTES
Caregiver given discharge summary with education on closed head injury and fall precautions, caregiver informed to follow up with her primary care physician     Carolyn Hernandez RN  05/09/23 [id number]

## 2023-05-09 NOTE — DISCHARGE INSTRUCTIONS
You were diagnosed with a closed head injury no indication for CT imaging of the head as you been acting appropriately no nausea or vomiting should these symptoms occur call 911 or return immediately or any symptoms concerning to you. Utilize Tylenol every 4-6 hours as needed for breakthrough pain. Please follow-up with your primary physician in the next 2 days to ensure improvement.

## 2023-05-15 ENCOUNTER — TELEPHONE (OUTPATIENT)
Dept: PRIMARY CARE CLINIC | Age: 68
End: 2023-05-15

## 2023-05-15 RX ORDER — CHLORAL HYDRATE 500 MG
1000 CAPSULE ORAL 3 TIMES DAILY
Qty: 90 CAPSULE | Refills: 0 | Status: SHIPPED | OUTPATIENT
Start: 2023-05-15

## 2023-05-15 RX ORDER — ACETAMINOPHEN 160 MG
1 TABLET,DISINTEGRATING ORAL DAILY
Qty: 90 CAPSULE | Refills: 0 | Status: SHIPPED | OUTPATIENT
Start: 2023-05-15

## 2023-06-05 ENCOUNTER — OFFICE VISIT (OUTPATIENT)
Dept: PRIMARY CARE CLINIC | Age: 68
End: 2023-06-05

## 2023-06-05 VITALS
BODY MASS INDEX: 35.15 KG/M2 | SYSTOLIC BLOOD PRESSURE: 115 MMHG | TEMPERATURE: 97.8 F | RESPIRATION RATE: 16 BRPM | HEIGHT: 62 IN | HEART RATE: 75 BPM | DIASTOLIC BLOOD PRESSURE: 70 MMHG | OXYGEN SATURATION: 98 % | WEIGHT: 191 LBS

## 2023-06-05 DIAGNOSIS — F79 INTELLECTUAL DISABILITY: Primary | ICD-10-CM

## 2023-06-05 DIAGNOSIS — E03.9 ACQUIRED HYPOTHYROIDISM: ICD-10-CM

## 2023-06-05 DIAGNOSIS — R42 DIZZINESS AND GIDDINESS: ICD-10-CM

## 2023-06-05 DIAGNOSIS — F29 PSYCHOSIS, UNSPECIFIED PSYCHOSIS TYPE (HCC): ICD-10-CM

## 2023-06-05 RX ORDER — OLANZAPINE 5 MG/1
5 TABLET ORAL NIGHTLY
Qty: 14 TABLET | Refills: 0 | Status: SHIPPED | OUTPATIENT
Start: 2023-06-05 | End: 2023-06-19

## 2023-06-05 NOTE — PROGRESS NOTES
Soto Jha is a 79 y.o. female with the following history of MR,psychosis was in ER after a fall aids state patinet been loosing her balance and sleeping quit a bit   Past Medical History:   Diagnosis Date    Anemia     Cervical spondylosis 10/27/2022    Chronic post-traumatic headache 10/27/2022    Clavicle fracture     Dizziness and giddiness 10/27/2022    DJD (degenerative joint disease)     Helicobacter pylori infection 12/2015    Mental disability     Muscle contraction headache 10/27/2022    Osteopenia     gets yearly injections    Pneumonia     Post-concussion headache 10/27/2022    Thyroid disease     Varicose veins        Past Surgical History:   Procedure Laterality Date    COLONOSCOPY  2015    CYST REMOVAL      (R) wrist    ENDOSCOPY, COLON, DIAGNOSTIC      HEEL SPUR SURGERY      (L) heel    THYROIDECTOMY, COMPLETION      TONSILLECTOMY         Family History   Problem Relation Age of Onset    Kidney Disease Mother     Stroke Mother     Diabetes Mother     Heart Failure Mother              Current Outpatient Medications:     OLANZapine (ZYPREXA) 5 MG tablet, Take 1 tablet by mouth nightly for 14 days, Disp: 14 tablet, Rfl: 0    Omega-3 Fatty Acids (FISH OIL) 1000 MG capsule, Take 1 capsule by mouth 3 times daily, Disp: 90 capsule, Rfl: 0    Cholecalciferol (VITAMIN D3) 50 MCG (2000 UT) CAPS, Take 1 capsule by mouth daily, Disp: 90 capsule, Rfl: 0    levothyroxine (SYNTHROID) 50 MCG tablet, Take 1 tablet by mouth Daily, Disp: 90 tablet, Rfl: 0    FLUoxetine (PROZAC) 20 MG capsule, Take 1 capsule by mouth daily, Disp: , Rfl:     acetaminophen (TYLENOL) 325 MG tablet, Take 2 tablets by mouth every 6 hours as needed for Pain, Disp: , Rfl:     SORE THROAT 1.4 % LIQD mouth spray, , Disp: , Rfl:     neomycin-bacitracin-polymyxin (NEOSPORIN) 3.5-400-5000 OINT ointment, , Disp: , Rfl:     COUGH/CHEST CONGESTION DM  MG/5ML SYRP, , Disp: , Rfl:     TUSSIN -10 MG/5ML syrup, , Disp: , Rfl:

## 2023-07-07 ENCOUNTER — OFFICE VISIT (OUTPATIENT)
Dept: PRIMARY CARE CLINIC | Age: 68
End: 2023-07-07

## 2023-07-07 VITALS
RESPIRATION RATE: 16 BRPM | SYSTOLIC BLOOD PRESSURE: 115 MMHG | DIASTOLIC BLOOD PRESSURE: 60 MMHG | TEMPERATURE: 98.7 F | BODY MASS INDEX: 34.55 KG/M2 | OXYGEN SATURATION: 97 % | HEIGHT: 63 IN | HEART RATE: 55 BPM | WEIGHT: 195 LBS

## 2023-07-07 DIAGNOSIS — Z00.00 INITIAL MEDICARE ANNUAL WELLNESS VISIT: Primary | ICD-10-CM

## 2023-07-07 ASSESSMENT — PATIENT HEALTH QUESTIONNAIRE - PHQ9
SUM OF ALL RESPONSES TO PHQ QUESTIONS 1-9: 0
DEPRESSION UNABLE TO ASSESS: FUNCTIONAL CAPACITY MOTIVATION LIMITS ACCURACY
2. FEELING DOWN, DEPRESSED OR HOPELESS: 0
1. LITTLE INTEREST OR PLEASURE IN DOING THINGS: 0
SUM OF ALL RESPONSES TO PHQ9 QUESTIONS 1 & 2: 0

## 2023-07-07 ASSESSMENT — LIFESTYLE VARIABLES
HOW MANY STANDARD DRINKS CONTAINING ALCOHOL DO YOU HAVE ON A TYPICAL DAY: PATIENT DOES NOT DRINK
HOW OFTEN DO YOU HAVE A DRINK CONTAINING ALCOHOL: NEVER

## 2023-07-07 NOTE — PATIENT INSTRUCTIONS
Advance Directives: Care Instructions  Overview  An advance directive is a legal way to state your wishes at the end of your life. It tells your family and your doctor what to do if you can't say what you want. There are two main types of advance directives. You can change them any time your wishes change. Living will. This form tells your family and your doctor your wishes about life support and other treatment. The form is also called a declaration. Medical power of . This form lets you name a person to make treatment decisions for you when you can't speak for yourself. This person is called a health care agent (health care proxy, health care surrogate). The form is also called a durable power of  for health care. If you do not have an advance directive, decisions about your medical care may be made by a family member, or by a doctor or a  who doesn't know you. It may help to think of an advance directive as a gift to the people who care for you. If you have one, they won't have to make tough decisions by themselves. For more information, including forms for your state, see the [address] [place] [address] website (www.caringinfo.org/planning/advance-directives/). Follow-up care is a key part of your treatment and safety. Be sure to make and go to all appointments, and call your doctor if you are having problems. It's also a good idea to know your test results and keep a list of the medicines you take. What should you include in an advance directive? Many states have a unique advance directive form. (It may ask you to address specific issues.) Or you might use a universal form that's approved by many states. If your form doesn't tell you what to address, it may be hard to know what to include in your advance directive. Use the questions below to help you get started. Who do you want to make decisions about your medical care if you are not able to?   What life-support measures do you want if you

## 2023-09-05 ENCOUNTER — OFFICE VISIT (OUTPATIENT)
Dept: PRIMARY CARE CLINIC | Age: 68
End: 2023-09-05

## 2023-09-05 VITALS
DIASTOLIC BLOOD PRESSURE: 80 MMHG | BODY MASS INDEX: 34.2 KG/M2 | TEMPERATURE: 97.5 F | HEIGHT: 63 IN | OXYGEN SATURATION: 95 % | RESPIRATION RATE: 18 BRPM | HEART RATE: 94 BPM | WEIGHT: 193 LBS | SYSTOLIC BLOOD PRESSURE: 130 MMHG

## 2023-09-05 DIAGNOSIS — R05.9 COUGH, UNSPECIFIED TYPE: Primary | ICD-10-CM

## 2023-09-05 DIAGNOSIS — J10.1 INFLUENZA A: ICD-10-CM

## 2023-09-05 DIAGNOSIS — F79 INTELLECTUAL DISABILITY: ICD-10-CM

## 2023-09-05 LAB
INFLUENZA A ANTIGEN, POC: POSITIVE
INFLUENZA B ANTIGEN, POC: NEGATIVE
LOT EXPIRE DATE: NORMAL
LOT KIT NUMBER: NORMAL
SARS-COV-2, POC: NORMAL
VALID INTERNAL CONTROL: PRESENT
VENDOR AND KIT NAME POC: NORMAL

## 2023-09-05 RX ORDER — OSELTAMIVIR PHOSPHATE 75 MG/1
75 CAPSULE ORAL 2 TIMES DAILY
Qty: 10 CAPSULE | Refills: 0 | Status: SHIPPED | OUTPATIENT
Start: 2023-09-05 | End: 2023-09-10

## 2023-09-05 ASSESSMENT — PATIENT HEALTH QUESTIONNAIRE - PHQ9
SUM OF ALL RESPONSES TO PHQ QUESTIONS 1-9: 0
1. LITTLE INTEREST OR PLEASURE IN DOING THINGS: 0
SUM OF ALL RESPONSES TO PHQ9 QUESTIONS 1 & 2: 0
2. FEELING DOWN, DEPRESSED OR HOPELESS: 0
SUM OF ALL RESPONSES TO PHQ QUESTIONS 1-9: 0

## 2023-09-05 NOTE — PROGRESS NOTES
due to Influenza A, otc cough suppressant   -     POCT COVID-19 & Influenza A/B  2. Influenza A SXS started 3 days ago  will treat with Tamiflu 75 mg BID for 5 days   3. Intellectual disability, no behavior problem       Return if symptoms worsen or fail to improve.       Reyes Mchugh MD

## 2023-09-29 ENCOUNTER — TELEPHONE (OUTPATIENT)
Dept: PRIMARY CARE CLINIC | Age: 68
End: 2023-09-29

## 2023-09-29 DIAGNOSIS — M81.0 OSTEOPOROSIS, UNSPECIFIED OSTEOPOROSIS TYPE, UNSPECIFIED PATHOLOGICAL FRACTURE PRESENCE: Primary | ICD-10-CM

## 2023-09-29 NOTE — TELEPHONE ENCOUNTER
Leidy from the [facility] in [address] is requesting a prescription for Prolia and Labs for Colon Splinter to be faxed to them at [phone]. Patient is scheduled to go in on 10-2-23 @ 1 pm for her injection. Thank you.

## 2023-10-02 ENCOUNTER — HOSPITAL ENCOUNTER (OUTPATIENT)
Dept: INFUSION THERAPY | Age: 68
Setting detail: INFUSION SERIES
End: 2023-10-02

## 2023-10-03 DIAGNOSIS — M81.0 OSTEOPOROSIS, UNSPECIFIED OSTEOPOROSIS TYPE, UNSPECIFIED PATHOLOGICAL FRACTURE PRESENCE: Primary | ICD-10-CM

## 2023-10-03 RX ORDER — CHLORAL HYDRATE 500 MG
1000 CAPSULE ORAL 3 TIMES DAILY
Qty: 90 CAPSULE | Refills: 0 | Status: SHIPPED | OUTPATIENT
Start: 2023-10-03

## 2023-10-05 PROBLEM — J10.1 INFLUENZA A: Status: RESOLVED | Noted: 2023-09-05 | Resolved: 2023-10-05

## 2023-10-05 PROBLEM — R05.9 COUGH: Status: RESOLVED | Noted: 2023-09-05 | Resolved: 2023-10-05

## 2023-10-09 ENCOUNTER — OFFICE VISIT (OUTPATIENT)
Dept: PRIMARY CARE CLINIC | Age: 68
End: 2023-10-09

## 2023-10-09 VITALS
BODY MASS INDEX: 34.73 KG/M2 | DIASTOLIC BLOOD PRESSURE: 84 MMHG | OXYGEN SATURATION: 98 % | WEIGHT: 196 LBS | TEMPERATURE: 97.8 F | SYSTOLIC BLOOD PRESSURE: 124 MMHG | HEART RATE: 78 BPM | HEIGHT: 63 IN | RESPIRATION RATE: 17 BRPM

## 2023-10-09 DIAGNOSIS — F79 INTELLECTUAL DISABILITY: ICD-10-CM

## 2023-10-09 DIAGNOSIS — F29 PSYCHOSIS, UNSPECIFIED PSYCHOSIS TYPE (HCC): ICD-10-CM

## 2023-10-09 DIAGNOSIS — E03.9 ACQUIRED HYPOTHYROIDISM: Primary | ICD-10-CM

## 2023-10-09 DIAGNOSIS — M81.0 OSTEOPOROSIS, UNSPECIFIED OSTEOPOROSIS TYPE, UNSPECIFIED PATHOLOGICAL FRACTURE PRESENCE: ICD-10-CM

## 2023-10-09 DIAGNOSIS — R53.83 OTHER FATIGUE: ICD-10-CM

## 2023-10-09 DIAGNOSIS — E03.9 ACQUIRED HYPOTHYROIDISM: ICD-10-CM

## 2023-10-09 LAB
ALBUMIN SERPL-MCNC: 4.5 G/DL (ref 3.5–5.2)
ALBUMIN SERPL-MCNC: 4.5 G/DL (ref 3.5–5.2)
ALP BLD-CCNC: 78 U/L (ref 35–104)
ALP SERPL-CCNC: 78 U/L (ref 35–104)
ALT SERPL-CCNC: 15 U/L (ref 0–32)
ALT SERPL-CCNC: 15 U/L (ref 0–32)
ANION GAP SERPL CALCULATED.3IONS-SCNC: 11 MMOL/L (ref 7–16)
ANION GAP SERPL CALCULATED.3IONS-SCNC: 11 MMOL/L (ref 7–16)
AST SERPL-CCNC: 20 U/L (ref 0–31)
AST SERPL-CCNC: 20 U/L (ref 0–31)
BILIRUB SERPL-MCNC: 0.2 MG/DL (ref 0–1.2)
BILIRUB SERPL-MCNC: 0.2 MG/DL (ref 0–1.2)
BUN BLDV-MCNC: 14 MG/DL (ref 6–23)
BUN SERPL-MCNC: 14 MG/DL (ref 6–23)
CALCIUM SERPL-MCNC: 10 MG/DL (ref 8.6–10.2)
CALCIUM SERPL-MCNC: 10 MG/DL (ref 8.6–10.2)
CHLORIDE BLD-SCNC: 104 MMOL/L (ref 98–107)
CHLORIDE SERPL-SCNC: 104 MMOL/L (ref 98–107)
CO2 SERPL-SCNC: 25 MMOL/L (ref 22–29)
CO2: 25 MMOL/L (ref 22–29)
CREAT SERPL-MCNC: 0.7 MG/DL (ref 0.5–1)
CREAT SERPL-MCNC: 0.7 MG/DL (ref 0.5–1)
GFR SERPL CREATININE-BSD FRML MDRD: >60 ML/MIN/1.73M2
GFR SERPL CREATININE-BSD FRML MDRD: >60 ML/MIN/1.73M2
GLUCOSE BLD-MCNC: 91 MG/DL (ref 74–99)
GLUCOSE SERPL-MCNC: 91 MG/DL (ref 74–99)
POTASSIUM SERPL-SCNC: 4.3 MMOL/L (ref 3.5–5)
POTASSIUM SERPL-SCNC: 4.3 MMOL/L (ref 3.5–5)
PROT SERPL-MCNC: 7.2 G/DL (ref 6.4–8.3)
SODIUM BLD-SCNC: 140 MMOL/L (ref 132–146)
SODIUM SERPL-SCNC: 140 MMOL/L (ref 132–146)
TOTAL PROTEIN: 7.2 G/DL (ref 6.4–8.3)
TSH SERPL DL<=0.05 MIU/L-ACNC: 3.14 UIU/ML (ref 0.27–4.2)
TSH SERPL DL<=0.05 MIU/L-ACNC: 3.14 UIU/ML (ref 0.27–4.2)

## 2023-10-09 PROCEDURE — 36415 COLL VENOUS BLD VENIPUNCTURE: CPT | Performed by: INTERNAL MEDICINE

## 2023-10-09 RX ORDER — OLANZAPINE 7.5 MG/1
1 TABLET ORAL NIGHTLY
COMMUNITY
Start: 2023-09-15

## 2023-10-09 RX ORDER — OLANZAPINE 2.5 MG/1
2.5 TABLET ORAL NIGHTLY
Qty: 30 TABLET | Refills: 3 | Status: SHIPPED | OUTPATIENT
Start: 2023-10-09

## 2023-10-09 RX ORDER — CHOLECALCIFEROL (VITAMIN D3) 50 MCG
TABLET ORAL
COMMUNITY
Start: 2023-09-15

## 2023-10-09 ASSESSMENT — ENCOUNTER SYMPTOMS
ABDOMINAL PAIN: 0
SHORTNESS OF BREATH: 0
COUGH: 0
NAUSEA: 0
VOMITING: 0
DIARRHEA: 0

## 2023-10-09 NOTE — PROGRESS NOTES
Jung Fu is a 76 y.o. female established was seen In the office  for evaluation of Intellectual Disability, Psychosis    HPI/Chief C/O:  Chief Complaint   Patient presents with    Sleep Problem     Just lays in bed    Noted by staff at home group that pte has been sleeping more ,less participation in activity ,patient states feels tired   Recent increase in Zyprexa to 12.5 mg qd     No Known Allergies    ROS:  Review of Systems   Constitutional:  Positive for activity change and fatigue. Respiratory:  Negative for cough and shortness of breath. Negative for Hemoptysis   Cardiovascular:  Negative for chest pain. Gastrointestinal:  Negative for abdominal pain, diarrhea, nausea and vomiting. Endocrine: Negative for polydipsia, polyphagia and polyuria. Genitourinary:  Negative for dysuria and hematuria. Skin:  Negative for rash. Neurological:  Negative for tremors and seizures.         Past Medical/Surgical Hx;  Reviewed with patient      Diagnosis Date    Anemia     Cervical spondylosis 10/27/2022    Chronic post-traumatic headache 10/27/2022    Clavicle fracture     Dizziness and giddiness 10/27/2022    DJD (degenerative joint disease)     Helicobacter pylori infection 12/2015    Mental disability     Muscle contraction headache 10/27/2022    Osteopenia     gets yearly injections    Pneumonia     Post-concussion headache 10/27/2022    Thyroid disease     Varicose veins      Past Surgical History:   Procedure Laterality Date    COLONOSCOPY  2015    CYST REMOVAL      (R) wrist    ENDOSCOPY, COLON, DIAGNOSTIC      HEEL SPUR SURGERY      (L) heel    THYROIDECTOMY, COMPLETION      TONSILLECTOMY         Past Family Hx:  Reviewed with patient      Problem Relation Age of Onset    Kidney Disease Mother     Stroke Mother     Diabetes Mother     Heart Failure Mother        Social Hx:  Reviewed with patient  Social History     Tobacco Use    Smoking status: Never    Smokeless tobacco:

## 2023-10-10 LAB
ERYTHROCYTE [DISTWIDTH] IN BLOOD BY AUTOMATED COUNT: 12.6 % (ref 11.5–15)
HCT VFR BLD AUTO: 39.9 % (ref 34–48)
HCT VFR BLD CALC: 39.9 % (ref 34–48)
HEMOGLOBIN: 13.1 G/DL (ref 11.5–15.5)
HGB BLD-MCNC: 13.1 G/DL (ref 11.5–15.5)
MCH RBC QN AUTO: 31.3 PG (ref 26–35)
MCH RBC QN AUTO: 31.3 PG (ref 26–35)
MCHC RBC AUTO-ENTMCNC: 32.8 G/DL (ref 32–34.5)
MCHC RBC AUTO-ENTMCNC: 32.8 G/DL (ref 32–34.5)
MCV RBC AUTO: 95.5 FL (ref 80–99.9)
MCV RBC AUTO: 95.5 FL (ref 80–99.9)
PDW BLD-RTO: 12.6 % (ref 11.5–15)
PLATELET # BLD AUTO: 222 K/UL (ref 130–450)
PLATELET # BLD: 222 K/UL (ref 130–450)
PMV BLD AUTO: 10.2 FL (ref 7–12)
PMV BLD AUTO: 10.2 FL (ref 7–12)
RBC # BLD AUTO: 4.18 M/UL (ref 3.5–5.5)
RBC # BLD: 4.18 M/UL (ref 3.5–5.5)
WBC # BLD: 5.5 K/UL (ref 4.5–11.5)
WBC OTHER # BLD: 5.5 K/UL (ref 4.5–11.5)

## 2023-11-06 ENCOUNTER — HOSPITAL ENCOUNTER (EMERGENCY)
Age: 68
Discharge: HOME | End: 2023-11-06
Payer: MEDICARE

## 2023-11-06 VITALS
SYSTOLIC BLOOD PRESSURE: 171 MMHG | TEMPERATURE: 98.7 F | BODY MASS INDEX: 34.73 KG/M2 | DIASTOLIC BLOOD PRESSURE: 76 MMHG | RESPIRATION RATE: 14 BRPM | HEART RATE: 63 BPM | HEIGHT: 63 IN | OXYGEN SATURATION: 98 % | WEIGHT: 196 LBS

## 2023-11-06 DIAGNOSIS — R35.0 URINARY FREQUENCY: ICD-10-CM

## 2023-11-06 DIAGNOSIS — N39.0 URINARY TRACT INFECTION WITHOUT HEMATURIA, SITE UNSPECIFIED: Primary | ICD-10-CM

## 2023-11-06 LAB
BACTERIA URNS QL MICRO: ABNORMAL
BILIRUB UR QL STRIP: NEGATIVE
CLARITY UR: ABNORMAL
COLOR UR: YELLOW
CRYSTALS URNS MICRO: ABNORMAL /HPF
GLUCOSE UR STRIP-MCNC: NEGATIVE MG/DL
HGB UR QL STRIP.AUTO: NEGATIVE
KETONES UR STRIP-MCNC: NEGATIVE MG/DL
LEUKOCYTE ESTERASE UR QL STRIP: ABNORMAL
NITRITE UR QL STRIP: NEGATIVE
PH UR STRIP: 6 [PH] (ref 5–9)
PROT UR STRIP-MCNC: NEGATIVE MG/DL
RBC #/AREA URNS HPF: ABNORMAL /HPF
SP GR UR STRIP: 1.02 (ref 1–1.03)
UROBILINOGEN UR STRIP-ACNC: 1 EU/DL (ref 0–1)
WBC #/AREA URNS HPF: ABNORMAL /HPF

## 2023-11-06 PROCEDURE — 81001 URINALYSIS AUTO W/SCOPE: CPT

## 2023-11-06 PROCEDURE — 99283 EMERGENCY DEPT VISIT LOW MDM: CPT

## 2023-11-06 PROCEDURE — 6370000000 HC RX 637 (ALT 250 FOR IP)

## 2023-11-06 PROCEDURE — 87086 URINE CULTURE/COLONY COUNT: CPT

## 2023-11-06 RX ORDER — CEFDINIR 300 MG/1
CAPSULE ORAL
Status: COMPLETED
Start: 2023-11-06 | End: 2023-11-06

## 2023-11-06 RX ORDER — CEFDINIR 300 MG/1
300 CAPSULE ORAL EVERY 12 HOURS SCHEDULED
Status: DISCONTINUED | OUTPATIENT
Start: 2023-11-06 | End: 2023-11-06 | Stop reason: HOSPADM

## 2023-11-06 RX ORDER — CEFDINIR 300 MG/1
300 CAPSULE ORAL 2 TIMES DAILY
Qty: 14 CAPSULE | Refills: 0 | Status: SHIPPED | OUTPATIENT
Start: 2023-11-06 | End: 2023-11-13

## 2023-11-06 RX ADMIN — CEFDINIR 300 MG: 300 CAPSULE ORAL at 18:41

## 2023-11-06 ASSESSMENT — PAIN - FUNCTIONAL ASSESSMENT
PAIN_FUNCTIONAL_ASSESSMENT: ACTIVITIES ARE NOT PREVENTED
PAIN_FUNCTIONAL_ASSESSMENT: 0-10

## 2023-11-06 ASSESSMENT — PAIN DESCRIPTION - PAIN TYPE: TYPE: ACUTE PAIN

## 2023-11-06 ASSESSMENT — PAIN DESCRIPTION - FREQUENCY: FREQUENCY: CONTINUOUS

## 2023-11-06 ASSESSMENT — PAIN SCALES - GENERAL: PAINLEVEL_OUTOF10: 5

## 2023-11-06 ASSESSMENT — PAIN DESCRIPTION - DESCRIPTORS: DESCRIPTORS: BURNING

## 2023-11-06 ASSESSMENT — PAIN DESCRIPTION - ONSET: ONSET: ON-GOING

## 2023-11-08 LAB
MICROORGANISM SPEC CULT: ABNORMAL
SPECIMEN DESCRIPTION: ABNORMAL

## 2023-11-15 ENCOUNTER — TELEPHONE (OUTPATIENT)
Dept: PRIMARY CARE CLINIC | Age: 68
End: 2023-11-15

## 2023-11-15 NOTE — TELEPHONE ENCOUNTER
Haley Treviño from [facility] called today. Cayetano Silva pt has a uti, is being treated and doing well. Asking if pt need to have appt w/ELI, or current status is ok?

## 2023-12-04 ENCOUNTER — OFFICE VISIT (OUTPATIENT)
Dept: PRIMARY CARE CLINIC | Age: 68
End: 2023-12-04
Payer: MEDICARE

## 2023-12-04 VITALS
OXYGEN SATURATION: 97 % | WEIGHT: 182 LBS | DIASTOLIC BLOOD PRESSURE: 82 MMHG | TEMPERATURE: 97.8 F | BODY MASS INDEX: 32.25 KG/M2 | HEART RATE: 83 BPM | RESPIRATION RATE: 16 BRPM | HEIGHT: 63 IN | SYSTOLIC BLOOD PRESSURE: 126 MMHG

## 2023-12-04 DIAGNOSIS — F79 INTELLECTUAL DISABILITY: ICD-10-CM

## 2023-12-04 DIAGNOSIS — E03.9 ACQUIRED HYPOTHYROIDISM: ICD-10-CM

## 2023-12-04 DIAGNOSIS — K21.9 GASTROESOPHAGEAL REFLUX DISEASE WITHOUT ESOPHAGITIS: Primary | ICD-10-CM

## 2023-12-04 PROCEDURE — G8417 CALC BMI ABV UP PARAM F/U: HCPCS | Performed by: INTERNAL MEDICINE

## 2023-12-04 PROCEDURE — 1090F PRES/ABSN URINE INCON ASSESS: CPT | Performed by: INTERNAL MEDICINE

## 2023-12-04 PROCEDURE — 1123F ACP DISCUSS/DSCN MKR DOCD: CPT | Performed by: INTERNAL MEDICINE

## 2023-12-04 PROCEDURE — 3017F COLORECTAL CA SCREEN DOC REV: CPT | Performed by: INTERNAL MEDICINE

## 2023-12-04 PROCEDURE — G8427 DOCREV CUR MEDS BY ELIG CLIN: HCPCS | Performed by: INTERNAL MEDICINE

## 2023-12-04 PROCEDURE — G8484 FLU IMMUNIZE NO ADMIN: HCPCS | Performed by: INTERNAL MEDICINE

## 2023-12-04 PROCEDURE — 99213 OFFICE O/P EST LOW 20 MIN: CPT | Performed by: INTERNAL MEDICINE

## 2023-12-04 PROCEDURE — G8400 PT W/DXA NO RESULTS DOC: HCPCS | Performed by: INTERNAL MEDICINE

## 2023-12-04 PROCEDURE — 1036F TOBACCO NON-USER: CPT | Performed by: INTERNAL MEDICINE

## 2023-12-04 RX ORDER — FAMOTIDINE 40 MG/1
40 TABLET, FILM COATED ORAL EVERY EVENING
Qty: 30 TABLET | Refills: 3 | Status: SHIPPED | OUTPATIENT
Start: 2023-12-04

## 2023-12-04 ASSESSMENT — ENCOUNTER SYMPTOMS
DIARRHEA: 0
NAUSEA: 0
ABDOMINAL PAIN: 0
SHORTNESS OF BREATH: 0
COUGH: 0
VOMITING: 0

## 2023-12-04 NOTE — PROGRESS NOTES
Maricruz Bhandari is a 76 y.o. female established was seen In the office  for evaluation. HPI/Chief C/O:  Chief Complaint   Patient presents with    Otalgia     Patient might be here for ear pain? The caregiver doesn't know why the patient is here, might be a follow up from her last visit of hyperthyroidism      No Known Allergies    ROS:  Review of Systems   Respiratory:  Negative for cough and shortness of breath. Negative for Hemoptysis   Cardiovascular:  Negative for chest pain. Gastrointestinal:  Negative for abdominal pain, diarrhea, nausea and vomiting. Heart burn with spicy food    Endocrine: Negative for polydipsia, polyphagia and polyuria. Genitourinary:  Negative for dysuria and hematuria. Skin:  Negative for rash. Neurological:  Negative for tremors and seizures.         Past Medical/Surgical Hx;  Reviewed with patient      Diagnosis Date    Anemia     Cervical spondylosis 10/27/2022    Chronic post-traumatic headache 10/27/2022    Clavicle fracture     Dizziness and giddiness 10/27/2022    DJD (degenerative joint disease)     Helicobacter pylori infection 12/2015    Mental disability     Muscle contraction headache 10/27/2022    Osteopenia     gets yearly injections    Pneumonia     Post-concussion headache 10/27/2022    Thyroid disease     Varicose veins      Past Surgical History:   Procedure Laterality Date    COLONOSCOPY  2015    CYST REMOVAL      (R) wrist    ENDOSCOPY, COLON, DIAGNOSTIC      HEEL SPUR SURGERY      (L) heel    THYROIDECTOMY, COMPLETION      TONSILLECTOMY         Past Family Hx:  Reviewed with patient      Problem Relation Age of Onset    Kidney Disease Mother     Stroke Mother     Diabetes Mother     Heart Failure Mother        Social Hx:  Reviewed with patient  Social History     Tobacco Use    Smoking status: Never    Smokeless tobacco: Never   Substance Use Topics    Alcohol use: Not Currently     Alcohol/week: 0.0 standard drinks of

## 2023-12-07 ENCOUNTER — HOSPITAL ENCOUNTER (EMERGENCY)
Age: 68
Discharge: HOME | End: 2023-12-07
Payer: MEDICARE

## 2023-12-07 VITALS
OXYGEN SATURATION: 100 % | DIASTOLIC BLOOD PRESSURE: 77 MMHG | HEART RATE: 62 BPM | TEMPERATURE: 97.8 F | RESPIRATION RATE: 18 BRPM | SYSTOLIC BLOOD PRESSURE: 128 MMHG

## 2023-12-07 DIAGNOSIS — T50.901A ACCIDENTAL DRUG INGESTION, INITIAL ENCOUNTER: Primary | ICD-10-CM

## 2023-12-07 PROCEDURE — 99282 EMERGENCY DEPT VISIT SF MDM: CPT

## 2023-12-07 ASSESSMENT — PAIN - FUNCTIONAL ASSESSMENT: PAIN_FUNCTIONAL_ASSESSMENT: NONE - DENIES PAIN

## 2023-12-08 NOTE — ED PROVIDER NOTES
Independent AUBREY Visit. [facility] [address]  Department of Emergency Medicine   ED  Encounter Note  Admit Date/RoomTime: 2023  7:14 PM  ED Room: 10/10  NAME: Sweta Sotomayor  : 1955  MRN: [id number]     Chief Complaint:  OTHER (Took another persons medication by mistake. The medications were loperimide 2 mg, mag ox 400 mg., diphenoxylate atrop 2.5-. 025. Happened around 4:30 pm.  No adverse effects noted. )    HISTORY OF PRESENT ILLNESS        Sweta Sotomayor is a 76 y.o. female who presents to the ED for evaluation after taking the wrong medication. Patient is attended by staff medicine from via [address] [place] [address].  Patient is MRDD and intellectual disability. Per staff member her medication and another patient's medication were next to each other. This patient accidentally took the wrong medication. Per staff she took 2 of the loperamide pills. One of the mag oxide pills and one of the Lomotil pills. This occurred around 430 this afternoon. Per staff she has been acting like normal.  No complaints. Per patient nothing hurts her. She denies any abdominal pain. Denies any upset stomach. No vomiting. She cannot tell me the last time she had a bowel movement and staff does not know either. No complaints of pain. ROS   Pertinent positives and negatives are stated within HPI, all other systems reviewed and are negative. Past Medical History:  has a past medical history of Anemia, Cervical spondylosis, Chronic post-traumatic headache, Clavicle fracture, Dizziness and giddiness, DJD (degenerative joint disease), Helicobacter pylori infection, Mental disability, Muscle contraction headache, Osteopenia, Pneumonia, Post-concussion headache, Thyroid disease, and Varicose veins. Surgical History:  has a past surgical history that includes Tonsillectomy; cyst removal; Heel spur surgery; Colonoscopy (); Endoscopy, colon, diagnostic; and Thyroidectomy, Completion.     Social

## 2023-12-10 ENCOUNTER — HOSPITAL ENCOUNTER (EMERGENCY)
Age: 68
Discharge: HOME | End: 2023-12-10
Attending: EMERGENCY MEDICINE
Payer: MEDICARE

## 2023-12-10 ENCOUNTER — APPOINTMENT (OUTPATIENT)
Dept: CT IMAGING | Age: 68
End: 2023-12-10
Payer: MEDICARE

## 2023-12-10 VITALS
DIASTOLIC BLOOD PRESSURE: 74 MMHG | TEMPERATURE: 98.1 F | SYSTOLIC BLOOD PRESSURE: 186 MMHG | HEART RATE: 60 BPM | RESPIRATION RATE: 16 BRPM | OXYGEN SATURATION: 97 %

## 2023-12-10 DIAGNOSIS — S09.90XA INJURY OF HEAD, INITIAL ENCOUNTER: Primary | ICD-10-CM

## 2023-12-10 PROCEDURE — 70450 CT HEAD/BRAIN W/O DYE: CPT

## 2023-12-10 PROCEDURE — 72125 CT NECK SPINE W/O DYE: CPT

## 2023-12-10 PROCEDURE — 6370000000 HC RX 637 (ALT 250 FOR IP): Performed by: EMERGENCY MEDICINE

## 2023-12-10 PROCEDURE — 99284 EMERGENCY DEPT VISIT MOD MDM: CPT

## 2023-12-10 RX ORDER — ACETAMINOPHEN 500 MG
1000 TABLET ORAL ONCE
Status: COMPLETED | OUTPATIENT
Start: 2023-12-10 | End: 2023-12-10

## 2023-12-10 RX ADMIN — ACETAMINOPHEN 1000 MG: 500 TABLET ORAL at 14:51

## 2023-12-10 ASSESSMENT — PAIN DESCRIPTION - DESCRIPTORS
DESCRIPTORS: ACHING;PRESSURE;POUNDING;THROBBING
DESCRIPTORS: DISCOMFORT;ACHING;DULL

## 2023-12-10 ASSESSMENT — PAIN SCALES - GENERAL
PAINLEVEL_OUTOF10: 9
PAINLEVEL_OUTOF10: 5

## 2023-12-10 ASSESSMENT — PAIN DESCRIPTION - ORIENTATION
ORIENTATION: RIGHT
ORIENTATION: LEFT;ANTERIOR

## 2023-12-10 ASSESSMENT — PAIN - FUNCTIONAL ASSESSMENT: PAIN_FUNCTIONAL_ASSESSMENT: 0-10

## 2023-12-10 ASSESSMENT — ENCOUNTER SYMPTOMS
VOMITING: 0
SHORTNESS OF BREATH: 0
ABDOMINAL PAIN: 0
EYE REDNESS: 0
NAUSEA: 0

## 2023-12-10 ASSESSMENT — PAIN DESCRIPTION - LOCATION
LOCATION: HEAD
LOCATION: HEAD

## 2023-12-10 ASSESSMENT — PAIN DESCRIPTION - ONSET: ONSET: ON-GOING

## 2023-12-10 ASSESSMENT — PAIN DESCRIPTION - PAIN TYPE: TYPE: ACUTE PAIN

## 2023-12-10 ASSESSMENT — PAIN DESCRIPTION - FREQUENCY: FREQUENCY: CONTINUOUS

## 2023-12-10 NOTE — ED PROVIDER NOTES
[address] ENCOUNTER        Pt Name: Sweta Sotomayor  MRN: [id number]  [address] [facility] 1955  Date of evaluation: 12/10/2023  Provider: García Rosa DO  PCP: Lizabeth Milian MD  Note Started: 2:13 PM EST 12/10/23    CHIEF COMPLAINT       Chief Complaint   Patient presents with    Marylee Charon in shower this morning, bump to right side forehead, c/o headache, no LOC, no thinners       HISTORY OF PRESENT ILLNESS: 1 or more Elements       Sweta Sotomayor is a 76 y.o. female who presents to the emergency department with a chief complaint of fall. The history is obtained from the patient as well as the patient's medical record. Patient is presenting from a fall. Patient is from a group home. This is a mechanical fall. She fell out of the shower. She did strike her head. She did not lose consciousness. She did not try any treatment prior to arrival.  She has a mild pain in her head. No numbness, weakness or paresthesias. No other injury. Patient's not on any blood thinners    Nursing Notes were all reviewed and agreed with or any disagreements were addressed in the HPI. REVIEW OF SYSTEMS :      Review of Systems   Constitutional:  Negative for chills and fatigue. HENT:  Negative for congestion. Eyes:  Negative for redness. Respiratory:  Negative for shortness of breath. Cardiovascular:  Negative for chest pain. Gastrointestinal:  Negative for abdominal pain, nausea and vomiting. Genitourinary:  Negative for dysuria. Musculoskeletal:  Negative for arthralgias. Skin:  Negative for rash. Neurological:  Positive for headaches. Negative for light-headedness. Psychiatric/Behavioral:  Negative for confusion. All other systems reviewed and are negative. Positives and Pertinent negatives as per HPI.      SURGICAL HISTORY     Past Surgical History:   Procedure Laterality Date    COLONOSCOPY  2015 EKG completed @ [id number]

## 2024-01-02 ENCOUNTER — HOSPITAL ENCOUNTER (EMERGENCY)
Age: 69
Discharge: HOME | End: 2024-01-02
Attending: EMERGENCY MEDICINE
Payer: MEDICARE

## 2024-01-02 ENCOUNTER — APPOINTMENT (OUTPATIENT)
Dept: GENERAL RADIOLOGY | Age: 69
End: 2024-01-02
Payer: MEDICARE

## 2024-01-02 ENCOUNTER — APPOINTMENT (OUTPATIENT)
Dept: CT IMAGING | Age: 69
End: 2024-01-02
Payer: MEDICARE

## 2024-01-02 ENCOUNTER — HOSPITAL ENCOUNTER (EMERGENCY)
Age: 69
Discharge: HOME | End: 2024-01-03
Attending: EMERGENCY MEDICINE
Payer: MEDICARE

## 2024-01-02 VITALS
OXYGEN SATURATION: 97 % | HEART RATE: 58 BPM | SYSTOLIC BLOOD PRESSURE: 165 MMHG | WEIGHT: 185 LBS | TEMPERATURE: 99 F | HEIGHT: 63 IN | RESPIRATION RATE: 16 BRPM | DIASTOLIC BLOOD PRESSURE: 70 MMHG | BODY MASS INDEX: 32.78 KG/M2

## 2024-01-02 DIAGNOSIS — T18.128A ESOPHAGEAL OBSTRUCTION DUE TO FOOD IMPACTION: Primary | ICD-10-CM

## 2024-01-02 DIAGNOSIS — K22.2 ESOPHAGEAL OBSTRUCTION DUE TO FOOD IMPACTION: Primary | ICD-10-CM

## 2024-01-02 DIAGNOSIS — R10.84 GENERALIZED ABDOMINAL PAIN: Primary | ICD-10-CM

## 2024-01-02 DIAGNOSIS — R11.2 NAUSEA AND VOMITING, UNSPECIFIED VOMITING TYPE: ICD-10-CM

## 2024-01-02 LAB
ANION GAP SERPL CALCULATED.3IONS-SCNC: 11 MMOL/L (ref 7–16)
BASOPHILS # BLD: 0.02 K/UL (ref 0–0.2)
BASOPHILS NFR BLD: 0 % (ref 0–2)
BILIRUB UR QL STRIP: NEGATIVE
BUN SERPL-MCNC: 17 MG/DL (ref 6–23)
CALCIUM SERPL-MCNC: 10 MG/DL (ref 8.6–10.2)
CHLORIDE SERPL-SCNC: 105 MMOL/L (ref 98–107)
CLARITY UR: CLEAR
CO2 SERPL-SCNC: 23 MMOL/L (ref 22–29)
COLOR UR: YELLOW
CREAT SERPL-MCNC: 0.9 MG/DL (ref 0.5–1)
EOSINOPHIL # BLD: 0.08 K/UL (ref 0.05–0.5)
EOSINOPHILS RELATIVE PERCENT: 1 % (ref 0–6)
ERYTHROCYTE [DISTWIDTH] IN BLOOD BY AUTOMATED COUNT: 12.7 % (ref 11.5–15)
GFR SERPL CREATININE-BSD FRML MDRD: >60 ML/MIN/1.73M2
GLUCOSE SERPL-MCNC: 103 MG/DL (ref 74–99)
GLUCOSE UR STRIP-MCNC: NEGATIVE MG/DL
HCT VFR BLD AUTO: 40.6 % (ref 34–48)
HGB BLD-MCNC: 13.4 G/DL (ref 11.5–15.5)
HGB UR QL STRIP.AUTO: ABNORMAL
IMM GRANULOCYTES # BLD AUTO: <0.03 K/UL (ref 0–0.58)
IMM GRANULOCYTES NFR BLD: 0 % (ref 0–5)
KETONES UR STRIP-MCNC: NEGATIVE MG/DL
LACTATE BLDV-SCNC: 1 MMOL/L (ref 0.5–2.2)
LEUKOCYTE ESTERASE UR QL STRIP: NEGATIVE
LYMPHOCYTES NFR BLD: 1.16 K/UL (ref 1.5–4)
LYMPHOCYTES RELATIVE PERCENT: 20 % (ref 20–42)
MAGNESIUM SERPL-MCNC: 2 MG/DL (ref 1.6–2.6)
MCH RBC QN AUTO: 31.6 PG (ref 26–35)
MCHC RBC AUTO-ENTMCNC: 33 G/DL (ref 32–34.5)
MCV RBC AUTO: 95.8 FL (ref 80–99.9)
MONOCYTES NFR BLD: 0.58 K/UL (ref 0.1–0.95)
MONOCYTES NFR BLD: 10 % (ref 2–12)
NEUTROPHILS NFR BLD: 68 % (ref 43–80)
NEUTS SEG NFR BLD: 3.95 K/UL (ref 1.8–7.3)
NITRITE UR QL STRIP: NEGATIVE
PH UR STRIP: 5 [PH] (ref 5–9)
PLATELET # BLD AUTO: 221 K/UL (ref 130–450)
PMV BLD AUTO: 9.7 FL (ref 7–12)
POTASSIUM SERPL-SCNC: 3.8 MMOL/L (ref 3.5–5)
PROT UR STRIP-MCNC: ABNORMAL MG/DL
RBC # BLD AUTO: 4.24 M/UL (ref 3.5–5.5)
RBC #/AREA URNS HPF: ABNORMAL /HPF
SODIUM SERPL-SCNC: 139 MMOL/L (ref 132–146)
SP GR UR STRIP: >1.03 (ref 1–1.03)
UROBILINOGEN UR STRIP-ACNC: 0.2 EU/DL (ref 0–1)
WBC #/AREA URNS HPF: ABNORMAL /HPF
WBC OTHER # BLD: 5.8 K/UL (ref 4.5–11.5)

## 2024-01-02 PROCEDURE — 85025 COMPLETE CBC W/AUTO DIFF WBC: CPT

## 2024-01-02 PROCEDURE — 87086 URINE CULTURE/COLONY COUNT: CPT

## 2024-01-02 PROCEDURE — 6370000000 HC RX 637 (ALT 250 FOR IP): Performed by: EMERGENCY MEDICINE

## 2024-01-02 PROCEDURE — 83735 ASSAY OF MAGNESIUM: CPT

## 2024-01-02 PROCEDURE — 99283 EMERGENCY DEPT VISIT LOW MDM: CPT

## 2024-01-02 PROCEDURE — 71046 X-RAY EXAM CHEST 2 VIEWS: CPT

## 2024-01-02 PROCEDURE — 6360000004 HC RX CONTRAST MEDICATION: Performed by: EMERGENCY MEDICINE

## 2024-01-02 PROCEDURE — 71045 X-RAY EXAM CHEST 1 VIEW: CPT

## 2024-01-02 PROCEDURE — 2580000003 HC RX 258: Performed by: EMERGENCY MEDICINE

## 2024-01-02 PROCEDURE — 83605 ASSAY OF LACTIC ACID: CPT

## 2024-01-02 PROCEDURE — 74177 CT ABD & PELVIS W/CONTRAST: CPT

## 2024-01-02 PROCEDURE — 81001 URINALYSIS AUTO W/SCOPE: CPT

## 2024-01-02 PROCEDURE — 80048 BASIC METABOLIC PNL TOTAL CA: CPT

## 2024-01-02 PROCEDURE — 99285 EMERGENCY DEPT VISIT HI MDM: CPT

## 2024-01-02 RX ORDER — ONDANSETRON 2 MG/ML
4 INJECTION INTRAMUSCULAR; INTRAVENOUS ONCE
Status: DISCONTINUED | OUTPATIENT
Start: 2024-01-02 | End: 2024-01-02

## 2024-01-02 RX ORDER — 0.9 % SODIUM CHLORIDE 0.9 %
1000 INTRAVENOUS SOLUTION INTRAVENOUS ONCE
Status: DISCONTINUED | OUTPATIENT
Start: 2024-01-02 | End: 2024-01-02 | Stop reason: HOSPADM

## 2024-01-02 RX ORDER — ONDANSETRON 4 MG/1
4 TABLET, ORALLY DISINTEGRATING ORAL ONCE
Status: COMPLETED | OUTPATIENT
Start: 2024-01-02 | End: 2024-01-02

## 2024-01-02 RX ORDER — DICYCLOMINE HYDROCHLORIDE 10 MG/1
10 CAPSULE ORAL 4 TIMES DAILY
Qty: 28 CAPSULE | Refills: 0 | Status: SHIPPED | OUTPATIENT
Start: 2024-01-02

## 2024-01-02 RX ORDER — ONDANSETRON 4 MG/1
4 TABLET, FILM COATED ORAL EVERY 8 HOURS PRN
Qty: 10 TABLET | Refills: 0 | Status: SHIPPED | OUTPATIENT
Start: 2024-01-02

## 2024-01-02 RX ORDER — 0.9 % SODIUM CHLORIDE 0.9 %
1000 INTRAVENOUS SOLUTION INTRAVENOUS ONCE
Status: COMPLETED | OUTPATIENT
Start: 2024-01-02 | End: 2024-01-02

## 2024-01-02 RX ADMIN — IOPAMIDOL 75 ML: 755 INJECTION, SOLUTION INTRAVENOUS at 16:51

## 2024-01-02 RX ADMIN — ONDANSETRON 4 MG: 4 TABLET, ORALLY DISINTEGRATING ORAL at 22:53

## 2024-01-02 RX ADMIN — SODIUM CHLORIDE 1000 ML: 9 INJECTION, SOLUTION INTRAVENOUS at 14:07

## 2024-01-02 NOTE — ED TRIAGE NOTES
Department of Emergency Medicine  FIRST PROVIDER TRIAGE NOTE             Independent MLP           1/2/24  11:45 AM EST    Date of Encounter: 1/2/24   MRN: [id number]      HPI: Nathalie Mckeon is a 68 y.o. female who presents to the ED for Abdominal Pain and Diarrhea (Started 2 days ago)     Stable in triage     ROS: Negative for cp or sob.    PE: Gen Appearance/Constitutional: alert     Initial Plan of Care: All treatment areas with department are currently occupied. Plan to order/Initiate the following while awaiting opening in ED: labs and imaging studies.  Initiate Treatment-Testing, Proceed toTreatment Area When Bed Available for ED Attending/MLP to Continue Care    Electronically signed by Torri Messer PA-C   DD: 1/2/24

## 2024-01-03 VITALS
HEART RATE: 64 BPM | OXYGEN SATURATION: 98 % | DIASTOLIC BLOOD PRESSURE: 64 MMHG | SYSTOLIC BLOOD PRESSURE: 144 MMHG | TEMPERATURE: 97.8 F | RESPIRATION RATE: 15 BRPM

## 2024-01-03 LAB
MICROORGANISM SPEC CULT: ABNORMAL
SPECIMEN DESCRIPTION: ABNORMAL

## 2024-01-03 PROCEDURE — 6370000000 HC RX 637 (ALT 250 FOR IP): Performed by: EMERGENCY MEDICINE

## 2024-01-03 RX ORDER — LIDOCAINE HYDROCHLORIDE 20 MG/ML
15 SOLUTION OROPHARYNGEAL ONCE
Status: COMPLETED | OUTPATIENT
Start: 2024-01-03 | End: 2024-01-03

## 2024-01-03 RX ADMIN — LIDOCAINE HYDROCHLORIDE 3 ML: 20 SOLUTION ORAL; TOPICAL at 01:23

## 2024-01-03 NOTE — ED PROVIDER NOTES
[facility] EMERGENCY DEPARTMENT  EMERGENCY DEPARTMENT ENCOUNTER        Pt Name: Nathalie Mckeon  MRN: [id number]  Birthdate 1955  Date of evaluation: 1/2/2024  Provider: Jean-Claude Frances DO  PCP: Fabio Valles MD  Note Started: 10:46 PM EST 1/2/24    CHIEF COMPLAINT       Chief Complaint   Patient presents with    Emesis     Patient to ED with complaints of nausea and vomiting. Was eating and choked on a piece of meat, has been vomiting since. Patient was released from [place] a few hours ago. Caregiver in waiting room.       HISTORY OF PRESENT ILLNESS: 1 or more Elements   History From: Patient and caregiver    Limitations to history : None    Nathalie Mckeon is a 68 y.o. female who presents to the emergency department for suspected food bolus.  The patient was seen earlier today at [place] for abdominal pain.  Patient underwent labs and imaging and was discharged home.  After getting home patient was eating ground meat when she began choking on it.  The patient's caregiver did do the Heimlich maneuver and was able to remove meat from her mouth.  After this episode she began vomiting therefore they came back to the ED for further evaluation.  Currently the patient has no shortness of breath.  No chest pain.  No abdominal pain.    Nursing Notes were all reviewed and agreed with or any disagreements were addressed in the HPI.      REVIEW OF EXTERNAL NOTE :       PDMP Monitoring:    Last PDMP Jacinto as Reviewed:  Review User Review Instant Review Result   REYNA LEAVITT 7/6/2022  6:39 PM Reviewed PDMP [1]       Urine Drug Screenings (1 yr)       Urine Drug Screen  Collected: 2/23/2020 11:05 PM (Final result)              Urine Drug Screen  Collected: 2/22/2020  8:10 PM (Final result)              Serum Drug Screen  Collected: 2/23/2020 10:59 PM (Final result)              Serum Drug Screen  Collected: 2/22/2020  8:15 PM (Final result)

## 2024-01-12 ENCOUNTER — OFFICE VISIT (OUTPATIENT)
Dept: PRIMARY CARE CLINIC | Age: 69
End: 2024-01-12

## 2024-01-12 VITALS
BODY MASS INDEX: 33.66 KG/M2 | TEMPERATURE: 97.6 F | SYSTOLIC BLOOD PRESSURE: 116 MMHG | RESPIRATION RATE: 17 BRPM | WEIGHT: 190 LBS | HEART RATE: 88 BPM | DIASTOLIC BLOOD PRESSURE: 80 MMHG | OXYGEN SATURATION: 98 % | HEIGHT: 63 IN

## 2024-01-12 DIAGNOSIS — R11.0 NAUSEA: ICD-10-CM

## 2024-01-12 DIAGNOSIS — F79 INTELLECTUAL DISABILITY: Primary | ICD-10-CM

## 2024-01-12 DIAGNOSIS — K21.9 GASTROESOPHAGEAL REFLUX DISEASE WITHOUT ESOPHAGITIS: ICD-10-CM

## 2024-01-12 ASSESSMENT — ENCOUNTER SYMPTOMS
SHORTNESS OF BREATH: 0
NAUSEA: 0
DIARRHEA: 0
ABDOMINAL PAIN: 0
VOMITING: 0
COUGH: 0

## 2024-01-12 ASSESSMENT — PATIENT HEALTH QUESTIONNAIRE - PHQ9
2. FEELING DOWN, DEPRESSED OR HOPELESS: 0
SUM OF ALL RESPONSES TO PHQ QUESTIONS 1-9: 0
SUM OF ALL RESPONSES TO PHQ QUESTIONS 1-9: 0
1. LITTLE INTEREST OR PLEASURE IN DOING THINGS: 0
SUM OF ALL RESPONSES TO PHQ QUESTIONS 1-9: 0
SUM OF ALL RESPONSES TO PHQ9 QUESTIONS 1 & 2: 0
SUM OF ALL RESPONSES TO PHQ QUESTIONS 1-9: 0

## 2024-01-12 NOTE — PROGRESS NOTES
Maintenance   Topic    Hepatitis C screen     DEXA (modify frequency per FRAX score)     Respiratory Syncytial Virus (RSV) Pregnant or age 60 yrs+ (1 - 1-dose 60+ series)    Shingles vaccine (2 of 2)    Pneumococcal 65+ years Vaccine (2 - PCV)    Colorectal Cancer Screen     Breast cancer screen     COVID-19 Vaccine (4 - 2023-24 season)    Annual Wellness Visit (Medicare)     Depression Screen     Diabetes screen     Lipids     DTaP/Tdap/Td vaccine (2 - Td or Tdap)    Flu vaccine     Hepatitis A vaccine     Hepatitis B vaccine     Hib vaccine     Polio vaccine     Meningococcal (ACWY) vaccine     Pneumococcal 0-64 years Vaccine

## 2024-01-16 RX ORDER — DICYCLOMINE HYDROCHLORIDE 10 MG/1
10 CAPSULE ORAL 4 TIMES DAILY
Qty: 28 CAPSULE | Refills: 0 | Status: SHIPPED | OUTPATIENT
Start: 2024-01-16

## 2024-01-16 RX ORDER — DENOSUMAB 60 MG/ML
60 INJECTION SUBCUTANEOUS ONCE
Qty: 1 EACH | Refills: 2 | Status: SHIPPED | OUTPATIENT
Start: 2024-01-16 | End: 2024-01-16

## 2024-01-16 NOTE — TELEPHONE ENCOUNTER
She was given the prolia shot in office on 10/02/2023 so I pended it if you want her to stay on it but looks like she has some type of allergy to it.

## 2024-01-16 NOTE — TELEPHONE ENCOUNTER
Nabila from [employer] requesting a prescription for Tia be sent into [facility] on [address] for her    dicyclomine (BENTYL) 10 MG capsule 1 tablet 4x qd.    She would also know if Tia is still on the   denosumab (PROLIA) SC injection 60 mg.    States she is doing fine without it but if Dr. Jose Torres wants her to continue on it or not. If he does not want her to continue on it he will need to send a DC or to [facility] pharmacy. If he wants her to continue on it they will need a call back to schedule and appointment for Tia. Thank you.

## 2024-01-19 ENCOUNTER — TELEPHONE (OUTPATIENT)
Dept: PRIMARY CARE CLINIC | Age: 69
End: 2024-01-19

## 2024-01-19 NOTE — TELEPHONE ENCOUNTER
Jen from [employer] called asking if the  Still wants pt to get prolia infusions or the injections? Would like a call back [phone]

## 2024-02-01 NOTE — TELEPHONE ENCOUNTER
They need refill on the prolia     Also wants to know if the bentyl 40 mg a long term or short term medication

## 2024-02-02 RX ORDER — DENOSUMAB 60 MG/ML
60 INJECTION SUBCUTANEOUS ONCE
Qty: 1 EACH | Refills: 2 | Status: SHIPPED | OUTPATIENT
Start: 2024-02-02 | End: 2024-02-02

## 2024-02-06 DIAGNOSIS — Z87.898 HISTORY OF CHOKING: Primary | ICD-10-CM

## 2024-02-11 ENCOUNTER — APPOINTMENT (OUTPATIENT)
Dept: GENERAL RADIOLOGY | Age: 69
End: 2024-02-11
Payer: MEDICARE

## 2024-02-11 ENCOUNTER — HOSPITAL ENCOUNTER (EMERGENCY)
Age: 69
Discharge: HOME | End: 2024-02-11
Attending: EMERGENCY MEDICINE
Payer: MEDICARE

## 2024-02-11 ENCOUNTER — APPOINTMENT (OUTPATIENT)
Dept: CT IMAGING | Age: 69
End: 2024-02-11
Attending: EMERGENCY MEDICINE
Payer: MEDICARE

## 2024-02-11 VITALS
RESPIRATION RATE: 18 BRPM | HEART RATE: 67 BPM | SYSTOLIC BLOOD PRESSURE: 152 MMHG | OXYGEN SATURATION: 99 % | TEMPERATURE: 97.4 F | DIASTOLIC BLOOD PRESSURE: 91 MMHG

## 2024-02-11 DIAGNOSIS — S09.90XA CLOSED HEAD INJURY, INITIAL ENCOUNTER: Primary | ICD-10-CM

## 2024-02-11 DIAGNOSIS — T14.8XXA ABRASION: ICD-10-CM

## 2024-02-11 DIAGNOSIS — S50.01XA CONTUSION OF RIGHT ELBOW, INITIAL ENCOUNTER: ICD-10-CM

## 2024-02-11 PROCEDURE — 73080 X-RAY EXAM OF ELBOW: CPT

## 2024-02-11 PROCEDURE — 99284 EMERGENCY DEPT VISIT MOD MDM: CPT

## 2024-02-11 PROCEDURE — 70450 CT HEAD/BRAIN W/O DYE: CPT

## 2024-02-11 PROCEDURE — 72125 CT NECK SPINE W/O DYE: CPT

## 2024-02-12 NOTE — ED PROVIDER NOTES
[facility] EMERGENCY DEPARTMENT  EMERGENCY DEPARTMENT ENCOUNTER        Pt Name: Nathalie Mckeon  MRN: [id number]  Birthdate 1955  Date of evaluation: 2/11/2024  Provider: Rosey Pratt DO  PCP: Fabio Valles MD  Note Started: 7:37 PM EST 2/11/24    CHIEF COMPLAINT       Chief Complaint   Patient presents with    Fall     Reports mechanical fall tonight and hit her head and right arm       HISTORY OF PRESENT ILLNESS: 1 or more Elements        Limitations to history : None    Nathalie Mckeon is a 68 y.o. female who presents for evaluation after a mechanical fall.  She tripped and fell landing on her right arm.  She also hit her head.  Denies LOC.  She complains of abrasions and pain to the right inner elbow.  Denies headache or neck pain.  Tetanus is up-to-date    Nursing Notes were all reviewed and agreed with or any disagreements were addressed in the HPI.      REVIEW OF EXTERNAL NOTE :       Reviewed previous ER visit on 1/2/2024 for esophageal obstruction      Chart Review/External Note Review    Last Echo reviewed by Me:  No results found for: \"LVEF\", \"LVEFMODE\"          Controlled Substance Monitoring:    Acute and Chronic Pain Monitoring:        No data to display                    REVIEW OF SYSTEMS :      Positives and Pertinent negatives as per HPI.     SURGICAL HISTORY     Past Surgical History:   Procedure Laterality Date    COLONOSCOPY  2015    CYST REMOVAL      (R) wrist    ENDOSCOPY, COLON, DIAGNOSTIC      HEEL SPUR SURGERY      (L) heel    THYROIDECTOMY, COMPLETION      TONSILLECTOMY         CURRENTMEDICATIONS       Previous Medications    ACETAMINOPHEN (TYLENOL) 325 MG TABLET    Take 2 tablets by mouth every 6 hours as needed for Pain    BISMUTH SUBSALICYLATE (PEPTO BISMOL) 262 MG/15ML SUSPENSION    Take 15 mLs by mouth every 6 hours as needed for Indigestion    CHOLECALCIFEROL (VITAMIN D3) 50 MCG (2000 UT) CAPS    Take 1 capsule by mouth

## 2024-02-15 ENCOUNTER — HOSPITAL ENCOUNTER (EMERGENCY)
Age: 69
Discharge: HOME | End: 2024-02-15
Payer: MEDICARE

## 2024-02-15 ENCOUNTER — APPOINTMENT (OUTPATIENT)
Dept: ULTRASOUND IMAGING | Age: 69
End: 2024-02-15
Payer: MEDICARE

## 2024-02-15 VITALS
BODY MASS INDEX: 33.66 KG/M2 | TEMPERATURE: 97.8 F | WEIGHT: 190 LBS | SYSTOLIC BLOOD PRESSURE: 160 MMHG | HEART RATE: 70 BPM | OXYGEN SATURATION: 98 % | DIASTOLIC BLOOD PRESSURE: 72 MMHG | RESPIRATION RATE: 18 BRPM

## 2024-02-15 DIAGNOSIS — L03.113 CELLULITIS OF RIGHT UPPER EXTREMITY: Primary | ICD-10-CM

## 2024-02-15 PROCEDURE — 99284 EMERGENCY DEPT VISIT MOD MDM: CPT

## 2024-02-15 PROCEDURE — 93971 EXTREMITY STUDY: CPT

## 2024-02-15 PROCEDURE — 6370000000 HC RX 637 (ALT 250 FOR IP): Performed by: NURSE PRACTITIONER

## 2024-02-15 RX ORDER — BACITRACIN ZINC 500 [USP'U]/G
OINTMENT TOPICAL ONCE
Status: COMPLETED | OUTPATIENT
Start: 2024-02-15 | End: 2024-02-15

## 2024-02-15 RX ORDER — CEPHALEXIN 500 MG/1
500 CAPSULE ORAL 4 TIMES DAILY
Qty: 28 CAPSULE | Refills: 0 | Status: SHIPPED | OUTPATIENT
Start: 2024-02-15 | End: 2024-02-15

## 2024-02-15 RX ORDER — CEPHALEXIN 500 MG/1
500 CAPSULE ORAL 4 TIMES DAILY
Qty: 28 CAPSULE | Refills: 0 | Status: SHIPPED | OUTPATIENT
Start: 2024-02-15 | End: 2024-02-22

## 2024-02-15 RX ADMIN — BACITRACIN ZINC: 500 OINTMENT TOPICAL at 17:14

## 2024-02-16 ENCOUNTER — OFFICE VISIT (OUTPATIENT)
Dept: PRIMARY CARE CLINIC | Age: 69
End: 2024-02-16

## 2024-02-16 VITALS
HEART RATE: 74 BPM | RESPIRATION RATE: 18 BRPM | DIASTOLIC BLOOD PRESSURE: 52 MMHG | HEIGHT: 63 IN | WEIGHT: 189 LBS | SYSTOLIC BLOOD PRESSURE: 132 MMHG | TEMPERATURE: 96.9 F | OXYGEN SATURATION: 93 % | BODY MASS INDEX: 33.49 KG/M2

## 2024-02-16 DIAGNOSIS — S40.021D TRAUMATIC HEMATOMA OF RIGHT UPPER ARM, SUBSEQUENT ENCOUNTER: ICD-10-CM

## 2024-02-16 DIAGNOSIS — S41.111D LACERATION OF RIGHT UPPER ARM, SUBSEQUENT ENCOUNTER: ICD-10-CM

## 2024-02-16 DIAGNOSIS — Z09 HOSPITAL DISCHARGE FOLLOW-UP: Primary | ICD-10-CM

## 2024-02-16 RX ORDER — LEVOTHYROXINE SODIUM 0.05 MG/1
50 TABLET ORAL DAILY
Qty: 90 TABLET | Refills: 0 | Status: SHIPPED | OUTPATIENT
Start: 2024-02-16

## 2024-02-16 RX ORDER — OLANZAPINE 10 MG/1
10 TABLET ORAL NIGHTLY
COMMUNITY
Start: 2024-01-16

## 2024-02-16 RX ORDER — OXCARBAZEPINE 600 MG/1
600 TABLET, FILM COATED ORAL
COMMUNITY
Start: 2024-01-16

## 2024-02-16 NOTE — PROGRESS NOTES
SUBJECTIVE  Nathalie Mckeon is a 68 y.o. female established was seen In the office  for evaluation.    HPI/Chief C/O:  Chief Complaint   Patient presents with    Follow-Up from Hospital          Fell hurt right arm was in ER   Allergies   Allergen Reactions    Hydromorphone     Remicade [Infliximab]        ROS:  Review of Systems   Respiratory:  Negative for cough and shortness of breath.         Negative for Hemoptysis   Cardiovascular:  Negative for chest pain.   Gastrointestinal:  Negative for abdominal pain, diarrhea, nausea and vomiting.   Endocrine: Negative for polydipsia, polyphagia and polyuria.   Genitourinary:  Negative for dysuria and hematuria.   Skin:  Negative for rash.   Neurological:  Negative for tremors and seizures.        Past Medical/Surgical Hx;  Reviewed with patient      Diagnosis Date    Anemia     Cervical spondylosis 10/27/2022    Chronic post-traumatic headache 10/27/2022    Clavicle fracture     Dizziness and giddiness 10/27/2022    DJD (degenerative joint disease)     Helicobacter pylori infection 12/2015    Mental disability     Muscle contraction headache 10/27/2022    Osteopenia     gets yearly injections    Pneumonia     Post-concussion headache 10/27/2022    Thyroid disease     Varicose veins      Past Surgical History:   Procedure Laterality Date    COLONOSCOPY  2015    CYST REMOVAL      (R) wrist    ENDOSCOPY, COLON, DIAGNOSTIC      HEEL SPUR SURGERY      (L) heel    THYROIDECTOMY, COMPLETION      TONSILLECTOMY         Past Family Hx:  Reviewed with patient      Problem Relation Age of Onset    Kidney Disease Mother     Stroke Mother     Diabetes Mother     Heart Failure Mother        Social Hx:  Reviewed with patient  Social History     Tobacco Use    Smoking status: Never    Smokeless tobacco: Never   Substance Use Topics    Alcohol use: Not Currently     Alcohol/week: 0.0 standard drinks of alcohol       OBJECTIVE  BP (!) 132/52   Pulse 74   Temp 96.9 °F (36.1 °C)

## 2024-02-16 NOTE — ED PROVIDER NOTES
Kidney Disease in her mother; Stroke in her mother.     The patient’s home medications have been reviewed.    Allergies: Hydromorphone and Remicade [infliximab]    -------------------------------------------------- RESULTS -------------------------------------------------  All laboratory and radiology results have been personally reviewed by myself   LABS:  No results found for this visit on 02/15/24.    RADIOLOGY:  Interpreted by Radiologist.  Vascular duplex upper extremity venous right   Final Result   No evidence of DVT.             ------------------------- NURSING NOTES AND VITALS REVIEWED ---------------------------   The nursing notes within the ED encounter and vital signs as below have been reviewed.   BP (!) 160/72   Pulse 70   Temp 97.8 °F (36.6 °C) (Temporal)   Resp 18   Wt 86.2 kg (190 lb)   SpO2 98%   BMI 33.66 kg/m²   Oxygen Saturation Interpretation: Normal      ---------------------------------------------------PHYSICAL EXAM--------------------------------------      Constitutional/General: Alert and oriented x3, well appearing, non toxic in NAD  Head: NC/AT  Eyes: PERRL, EOMI  Mouth: Oropharynx clear, handling secretions, no trismus  Neck: Supple, full ROM, no meningeal signs  Pulmonary: Lungs clear to auscultation bilaterally, no wheezes, rales, or rhonchi. Not in respiratory distress  Cardiovascular:  Regular rate and rhythm, no murmurs, gallops, or rubs. 2+ distal pulses  Abdomen: Soft, non tender, non distended,   Extremities: Moves all extremities x 4. Warm and well perfused, there is edema and ecchymosis noted to the right upper extremity in particular to the medial aspect of the right humerus area.  There are several superficial abrasions.  No surrounding erythema is noted.  There is ecchymosis.  No evidence of purulent drainage or discharge noted.  She has full range of motion of the right shoulder, right elbow, right wrist, right hand.  Radial pulses palpable 2+.  Skin: warm and dry

## 2024-02-25 ENCOUNTER — HOSPITAL ENCOUNTER (EMERGENCY)
Age: 69
Discharge: HOME | End: 2024-02-25
Attending: STUDENT IN AN ORGANIZED HEALTH CARE EDUCATION/TRAINING PROGRAM
Payer: MEDICARE

## 2024-02-25 ENCOUNTER — APPOINTMENT (OUTPATIENT)
Dept: CT IMAGING | Age: 69
End: 2024-02-25
Payer: MEDICARE

## 2024-02-25 VITALS
TEMPERATURE: 98.4 F | DIASTOLIC BLOOD PRESSURE: 76 MMHG | OXYGEN SATURATION: 97 % | SYSTOLIC BLOOD PRESSURE: 148 MMHG | HEART RATE: 98 BPM | RESPIRATION RATE: 20 BRPM

## 2024-02-25 DIAGNOSIS — L03.113 CELLULITIS OF RIGHT UPPER EXTREMITY: Primary | ICD-10-CM

## 2024-02-25 LAB
ALBUMIN SERPL-MCNC: 4.3 G/DL (ref 3.5–5.2)
ALP SERPL-CCNC: 254 U/L (ref 35–104)
ALT SERPL-CCNC: 14 U/L (ref 0–32)
ANION GAP SERPL CALCULATED.3IONS-SCNC: 13 MMOL/L (ref 7–16)
AST SERPL-CCNC: 16 U/L (ref 0–31)
BASOPHILS # BLD: 0.06 K/UL (ref 0–0.2)
BASOPHILS NFR BLD: 1 % (ref 0–2)
BILIRUB SERPL-MCNC: 0.3 MG/DL (ref 0–1.2)
BUN SERPL-MCNC: 16 MG/DL (ref 6–23)
CALCIUM SERPL-MCNC: 10 MG/DL (ref 8.6–10.2)
CHLORIDE SERPL-SCNC: 106 MMOL/L (ref 98–107)
CO2 SERPL-SCNC: 25 MMOL/L (ref 22–29)
CREAT SERPL-MCNC: 0.8 MG/DL (ref 0.5–1)
CRP SERPL HS-MCNC: 22 MG/L (ref 0–5)
EOSINOPHIL # BLD: 0.18 K/UL (ref 0.05–0.5)
EOSINOPHILS RELATIVE PERCENT: 4 % (ref 0–6)
ERYTHROCYTE [DISTWIDTH] IN BLOOD BY AUTOMATED COUNT: 12.6 % (ref 11.5–15)
ERYTHROCYTE [SEDIMENTATION RATE] IN BLOOD BY WESTERGREN METHOD: 10 MM/HR (ref 0–20)
GFR SERPL CREATININE-BSD FRML MDRD: >60 ML/MIN/1.73M2
GLUCOSE SERPL-MCNC: 93 MG/DL (ref 74–99)
HCT VFR BLD AUTO: 35.5 % (ref 34–48)
HGB BLD-MCNC: 12.2 G/DL (ref 11.5–15.5)
IMM GRANULOCYTES # BLD AUTO: 0.03 K/UL (ref 0–0.58)
IMM GRANULOCYTES NFR BLD: 1 % (ref 0–5)
LACTATE BLDV-SCNC: 1.3 MMOL/L (ref 0.5–2.2)
LYMPHOCYTES NFR BLD: 1.54 K/UL (ref 1.5–4)
LYMPHOCYTES RELATIVE PERCENT: 30 % (ref 20–42)
MCH RBC QN AUTO: 32.1 PG (ref 26–35)
MCHC RBC AUTO-ENTMCNC: 34.4 G/DL (ref 32–34.5)
MCV RBC AUTO: 93.4 FL (ref 80–99.9)
MONOCYTES NFR BLD: 0.54 K/UL (ref 0.1–0.95)
MONOCYTES NFR BLD: 11 % (ref 2–12)
NEUTROPHILS NFR BLD: 54 % (ref 43–80)
NEUTS SEG NFR BLD: 2.79 K/UL (ref 1.8–7.3)
PLATELET # BLD AUTO: 272 K/UL (ref 130–450)
PMV BLD AUTO: 9.9 FL (ref 7–12)
POTASSIUM SERPL-SCNC: 3.9 MMOL/L (ref 3.5–5)
PROT SERPL-MCNC: 7.4 G/DL (ref 6.4–8.3)
RBC # BLD AUTO: 3.8 M/UL (ref 3.5–5.5)
SODIUM SERPL-SCNC: 144 MMOL/L (ref 132–146)
WBC OTHER # BLD: 5.1 K/UL (ref 4.5–11.5)

## 2024-02-25 PROCEDURE — 85652 RBC SED RATE AUTOMATED: CPT

## 2024-02-25 PROCEDURE — 87040 BLOOD CULTURE FOR BACTERIA: CPT

## 2024-02-25 PROCEDURE — 73200 CT UPPER EXTREMITY W/O DYE: CPT

## 2024-02-25 PROCEDURE — 85025 COMPLETE CBC W/AUTO DIFF WBC: CPT

## 2024-02-25 PROCEDURE — 86140 C-REACTIVE PROTEIN: CPT

## 2024-02-25 PROCEDURE — 80053 COMPREHEN METABOLIC PANEL: CPT

## 2024-02-25 PROCEDURE — 99284 EMERGENCY DEPT VISIT MOD MDM: CPT

## 2024-02-25 PROCEDURE — 83605 ASSAY OF LACTIC ACID: CPT

## 2024-02-25 RX ORDER — AMOXICILLIN AND CLAVULANATE POTASSIUM 875; 125 MG/1; MG/1
1 TABLET, FILM COATED ORAL 2 TIMES DAILY
Qty: 20 TABLET | Refills: 0 | Status: SHIPPED | OUTPATIENT
Start: 2024-02-25 | End: 2024-03-06

## 2024-02-25 RX ORDER — DOXYCYCLINE HYCLATE 100 MG
100 TABLET ORAL 2 TIMES DAILY
Qty: 20 TABLET | Refills: 0 | Status: SHIPPED | OUTPATIENT
Start: 2024-02-25 | End: 2024-03-06

## 2024-02-25 RX ORDER — DOXYCYCLINE HYCLATE 100 MG
100 TABLET ORAL 2 TIMES DAILY
Qty: 14 TABLET | Refills: 0 | Status: SHIPPED | OUTPATIENT
Start: 2024-02-25 | End: 2024-02-25

## 2024-02-25 ASSESSMENT — PAIN SCALES - WONG BAKER: WONGBAKER_NUMERICALRESPONSE: 8

## 2024-02-25 ASSESSMENT — PAIN - FUNCTIONAL ASSESSMENT
PAIN_FUNCTIONAL_ASSESSMENT: WONG-BAKER FACES
PAIN_FUNCTIONAL_ASSESSMENT: PREVENTS OR INTERFERES SOME ACTIVE ACTIVITIES AND ADLS

## 2024-02-25 ASSESSMENT — PAIN DESCRIPTION - LOCATION: LOCATION: ARM

## 2024-02-25 ASSESSMENT — PAIN DESCRIPTION - ORIENTATION: ORIENTATION: LEFT

## 2024-02-25 ASSESSMENT — PAIN DESCRIPTION - DESCRIPTORS: DESCRIPTORS: DISCOMFORT

## 2024-02-25 NOTE — ED PROVIDER NOTES
Disposition:  Patient's disposition: Discharge to home  Patient's condition is stable.                                           Abhilash Hawkins MD  02/26/24 0602

## 2024-02-27 ENCOUNTER — TELEPHONE (OUTPATIENT)
Dept: PRIMARY CARE CLINIC | Age: 69
End: 2024-02-27

## 2024-02-27 NOTE — TELEPHONE ENCOUNTER
Received a call from Jia Momin at [facility] requested a swallow study be done on Nathalie because of her choking incident on 1-3-2024.  She can be reached at [phone] if you have any questions. They need to have this done in order to close this incident on their end. Thank you.

## 2024-02-27 NOTE — TELEPHONE ENCOUNTER
On 02/06/24 there was one placed under procedures, give them a call and see where they would like it to be done at    Stable

## 2024-02-29 ENCOUNTER — TRANSCRIBE ORDERS (OUTPATIENT)
Dept: ADMINISTRATIVE | Age: 69
End: 2024-02-29

## 2024-02-29 DIAGNOSIS — Z87.898 HISTORY OF CHOKING: Primary | ICD-10-CM

## 2024-03-01 LAB
MICROORGANISM SPEC CULT: NORMAL
MICROORGANISM SPEC CULT: NORMAL
SERVICE CMNT-IMP: NORMAL
SERVICE CMNT-IMP: NORMAL
SPECIMEN DESCRIPTION: NORMAL
SPECIMEN DESCRIPTION: NORMAL

## 2024-03-04 DIAGNOSIS — S41.111D LACERATION OF RIGHT UPPER ARM, SUBSEQUENT ENCOUNTER: Primary | ICD-10-CM

## 2024-03-05 ENCOUNTER — OFFICE VISIT (OUTPATIENT)
Dept: PRIMARY CARE CLINIC | Age: 69
End: 2024-03-05
Payer: MEDICARE

## 2024-03-05 VITALS
DIASTOLIC BLOOD PRESSURE: 68 MMHG | WEIGHT: 188 LBS | HEART RATE: 62 BPM | SYSTOLIC BLOOD PRESSURE: 120 MMHG | HEIGHT: 63 IN | TEMPERATURE: 97.6 F | RESPIRATION RATE: 16 BRPM | OXYGEN SATURATION: 97 % | BODY MASS INDEX: 33.31 KG/M2

## 2024-03-05 DIAGNOSIS — S40.022A CONTUSION OF BOTH UPPER EXTREMITIES, INITIAL ENCOUNTER: ICD-10-CM

## 2024-03-05 DIAGNOSIS — S40.021A CONTUSION OF BOTH UPPER EXTREMITIES, INITIAL ENCOUNTER: ICD-10-CM

## 2024-03-05 DIAGNOSIS — F79 INTELLECTUAL DISABILITY: ICD-10-CM

## 2024-03-05 DIAGNOSIS — S40.021D TRAUMATIC HEMATOMA OF RIGHT UPPER ARM, SUBSEQUENT ENCOUNTER: Primary | ICD-10-CM

## 2024-03-05 DIAGNOSIS — S41.111D LACERATION OF RIGHT UPPER ARM, SUBSEQUENT ENCOUNTER: ICD-10-CM

## 2024-03-05 PROBLEM — F29 PSYCHOSIS (HCC): Status: RESOLVED | Noted: 2020-02-25 | Resolved: 2024-03-05

## 2024-03-05 PROCEDURE — G8400 PT W/DXA NO RESULTS DOC: HCPCS | Performed by: INTERNAL MEDICINE

## 2024-03-05 PROCEDURE — 1123F ACP DISCUSS/DSCN MKR DOCD: CPT | Performed by: INTERNAL MEDICINE

## 2024-03-05 PROCEDURE — 1036F TOBACCO NON-USER: CPT | Performed by: INTERNAL MEDICINE

## 2024-03-05 PROCEDURE — G8417 CALC BMI ABV UP PARAM F/U: HCPCS | Performed by: INTERNAL MEDICINE

## 2024-03-05 PROCEDURE — 1090F PRES/ABSN URINE INCON ASSESS: CPT | Performed by: INTERNAL MEDICINE

## 2024-03-05 PROCEDURE — G8484 FLU IMMUNIZE NO ADMIN: HCPCS | Performed by: INTERNAL MEDICINE

## 2024-03-05 PROCEDURE — 99213 OFFICE O/P EST LOW 20 MIN: CPT | Performed by: INTERNAL MEDICINE

## 2024-03-05 PROCEDURE — 3017F COLORECTAL CA SCREEN DOC REV: CPT | Performed by: INTERNAL MEDICINE

## 2024-03-05 PROCEDURE — G8427 DOCREV CUR MEDS BY ELIG CLIN: HCPCS | Performed by: INTERNAL MEDICINE

## 2024-03-05 ASSESSMENT — ENCOUNTER SYMPTOMS
ABDOMINAL PAIN: 0
NAUSEA: 0
COUGH: 0
DIARRHEA: 0
VOMITING: 0
SHORTNESS OF BREATH: 0

## 2024-03-05 NOTE — PROGRESS NOTES
Nathalie's current problems      Discussed importance of regular Health Maintenance follow up  Health Maintenance   Topic    Hepatitis C screen     DEXA (modify frequency per FRAX score)     Respiratory Syncytial Virus (RSV) Pregnant or age 60 yrs+ (1 - 1-dose 60+ series)    Shingles vaccine (2 of 2)    Pneumococcal 65+ years Vaccine (2 - PCV)    Colorectal Cancer Screen     Breast cancer screen     COVID-19 Vaccine (4 - 2023-24 season)    Annual Wellness Visit (Medicare)     Depression Screen     Lipids     DTaP/Tdap/Td vaccine (2 - Td or Tdap)    Flu vaccine     Hepatitis A vaccine     Hepatitis B vaccine     Hib vaccine     Polio vaccine     Meningococcal (ACWY) vaccine     Pneumococcal 0-64 years Vaccine     Diabetes screen

## 2024-03-06 ENCOUNTER — HOSPITAL ENCOUNTER (OUTPATIENT)
Dept: GENERAL RADIOLOGY | Age: 69
Discharge: HOME | End: 2024-03-08
Attending: INTERNAL MEDICINE
Payer: MEDICARE

## 2024-03-06 DIAGNOSIS — Z87.898 HISTORY OF CHOKING: ICD-10-CM

## 2024-03-06 PROCEDURE — 2500000003 HC RX 250 WO HCPCS: Performed by: PHYSICIAN ASSISTANT

## 2024-03-06 PROCEDURE — 92526 ORAL FUNCTION THERAPY: CPT

## 2024-03-06 PROCEDURE — 74230 X-RAY XM SWLNG FUNCJ C+: CPT

## 2024-03-06 PROCEDURE — 92611 MOTION FLUOROSCOPY/SWALLOW: CPT

## 2024-03-06 RX ADMIN — BARIUM SULFATE 15 ML: 400 SUSPENSION ORAL at 09:32

## 2024-03-06 RX ADMIN — BARIUM SULFATE 15 ML: 0.81 POWDER, FOR SUSPENSION ORAL at 09:32

## 2024-03-06 RX ADMIN — BARIUM SULFATE 15 ML: 400 PASTE ORAL at 09:32

## 2024-03-06 NOTE — PROGRESS NOTES
SPEECH/LANGUAGE PATHOLOGY  VIDEOFLUOROSCOPIC STUDY OF SWALLOWING (MBS)   and PLAN OF CARE    PATIENT NAME:  Nathalie Mckeon  (female)     MRN:  [id number]    :  1955  (68 y.o.)  STATUS:  Outpatient    TODAY'S DATE:  3/6/2024  REFERRING PROVIDER:   Dr. HOMAR Liu  SPECIFIC PROVIDER ORDER: FL modified barium swallow with video  Date of order:  24   REASON FOR REFERRAL: choking   EVALUATING THERAPIST: JAYNE Cook      RESULTS:      DYSPHAGIA DIAGNOSIS:  functional swallow for age/premorbid functioning    DIET RECOMMENDATIONS:  Regular consistency solids (IDDSI level 7) with  thin liquids (IDDSI level 0)    FEEDING RECOMMENDATIONS:    Assistance level:  No assistance needed     Compensatory strategies recommended: No strategies are recommended at this time     Discussed recommendations with nursing and/or faxed report to referring provider: Yes    SPEECH THERAPY  PLAN OF CARE   The dysphagia POC is established based on physician order and dysphagia diagnosis    Dysphagia therapy is not recommended       Conditions Requiring Skilled Therapeutic Intervention for dysphagia:    Not applicable    SPECIFIC DYSPHAGIA INTERVENTIONS TO INCLUDE:     not applicable    Specific instructions for next treatment:  not applicable   Treatment Goals:    Short Term Goals:  Not applicable no therapy warranted     Long Term Goals:   Not applicable no therapy warranted      Patient/family Goal:    not applicable                  ADMITTING DIAGNOSIS: History of choking [Z87.898]     VISIT DIAGNOSIS:   Visit Diagnoses         Codes    History of choking     Z87.898                PATIENT REPORT/COMPLAINT: denies difficulty swallowing    PRIOR LEVEL OF SWALLOW FUNCTION:    Current Diet Order: No diet orders on file    PROCEDURE:  Consistencies Administered During the Evaluation   Liquids: thin liquid and nectar thick liquid   Solids:  pureed foods and solid foods      Method of Intake:   cup, straw, spoon  Self fed, Fed

## 2024-03-12 RX ORDER — FAMOTIDINE 40 MG/1
40 TABLET, FILM COATED ORAL EVERY EVENING
Qty: 93 TABLET | Refills: 1 | Status: SHIPPED | OUTPATIENT
Start: 2024-03-12

## 2024-03-18 DIAGNOSIS — M47.812 CERVICAL SPONDYLOSIS: Primary | Chronic | ICD-10-CM

## 2024-03-19 ENCOUNTER — COMMUNITY OUTREACH (OUTPATIENT)
Dept: PRIMARY CARE CLINIC | Age: 69
End: 2024-03-19

## 2024-03-19 NOTE — PROGRESS NOTES
Patient's HM shows they are overdue for Mammogram, Colorectal Screening.  Care Everywhere and  files searched.  Results attached to order and HM updated.

## 2024-03-25 ENCOUNTER — OFFICE VISIT (OUTPATIENT)
Dept: PRIMARY CARE CLINIC | Age: 69
End: 2024-03-25
Payer: MEDICARE

## 2024-03-25 VITALS
WEIGHT: 191 LBS | DIASTOLIC BLOOD PRESSURE: 82 MMHG | HEIGHT: 63 IN | OXYGEN SATURATION: 97 % | HEART RATE: 62 BPM | BODY MASS INDEX: 33.84 KG/M2 | TEMPERATURE: 97 F | RESPIRATION RATE: 16 BRPM | SYSTOLIC BLOOD PRESSURE: 120 MMHG

## 2024-03-25 DIAGNOSIS — M54.50 LOW BACK PAIN WITHOUT SCIATICA, UNSPECIFIED BACK PAIN LATERALITY, UNSPECIFIED CHRONICITY: Primary | ICD-10-CM

## 2024-03-25 DIAGNOSIS — F39 MOOD DISORDER (HCC): ICD-10-CM

## 2024-03-25 DIAGNOSIS — E66.09 CLASS 1 OBESITY DUE TO EXCESS CALORIES WITHOUT SERIOUS COMORBIDITY WITH BODY MASS INDEX (BMI) OF 33.0 TO 33.9 IN ADULT: ICD-10-CM

## 2024-03-25 PROCEDURE — 1123F ACP DISCUSS/DSCN MKR DOCD: CPT | Performed by: INTERNAL MEDICINE

## 2024-03-25 PROCEDURE — G8427 DOCREV CUR MEDS BY ELIG CLIN: HCPCS | Performed by: INTERNAL MEDICINE

## 2024-03-25 PROCEDURE — 1036F TOBACCO NON-USER: CPT | Performed by: INTERNAL MEDICINE

## 2024-03-25 PROCEDURE — G8484 FLU IMMUNIZE NO ADMIN: HCPCS | Performed by: INTERNAL MEDICINE

## 2024-03-25 PROCEDURE — 99213 OFFICE O/P EST LOW 20 MIN: CPT | Performed by: INTERNAL MEDICINE

## 2024-03-25 PROCEDURE — 3017F COLORECTAL CA SCREEN DOC REV: CPT | Performed by: INTERNAL MEDICINE

## 2024-03-25 PROCEDURE — 1090F PRES/ABSN URINE INCON ASSESS: CPT | Performed by: INTERNAL MEDICINE

## 2024-03-25 PROCEDURE — G8417 CALC BMI ABV UP PARAM F/U: HCPCS | Performed by: INTERNAL MEDICINE

## 2024-03-25 PROCEDURE — G8400 PT W/DXA NO RESULTS DOC: HCPCS | Performed by: INTERNAL MEDICINE

## 2024-03-25 ASSESSMENT — ENCOUNTER SYMPTOMS
SHORTNESS OF BREATH: 0
NAUSEA: 0
ABDOMINAL PAIN: 0
VOMITING: 0
COUGH: 0
DIARRHEA: 0

## 2024-03-25 NOTE — PROGRESS NOTES
°F (36.1 °C)   Resp 16   Ht 1.6 m (5' 3\")   Wt 86.6 kg (191 lb)   SpO2 97%   BMI 33.83 kg/m²     Problem List:  Nathalie does not have any pertinent problems on file.    PHYS EX:  Physical Exam  Eyes:      Extraocular Movements: Extraocular movements intact.      Pupils: Pupils are equal, round, and reactive to light.   Neck:      Thyroid: No thyromegaly.      Vascular: No carotid bruit or JVD.   Cardiovascular:      Heart sounds: No murmur heard.     No gallop.   Pulmonary:      Effort: Pulmonary effort is normal.      Breath sounds: Normal breath sounds.   Abdominal:      Palpations: There is no hepatomegaly or splenomegaly.      Tenderness: There is no abdominal tenderness.   Skin:     Coloration: Skin is not cyanotic.      Findings: No bruising or rash.      Nails: There is no clubbing.   Neurological:      General: No focal deficit present.       Extremities: Pedal pulses No Edema     ASSESSMENT/PLAN  Nathalie was seen today for lower back pain.    Diagnoses and all orders for this visit:    Low back pain without sciatica, unspecified back pain laterality, unspecified chronicity, Tylenol prn     Mood disorder (HCC),stable continue same     Class 1 obesity due to excess calories without serious comorbidity with body mass index (BMI) of 33.0 to 33.9 in adult Advised patient to watch diet, increase activity, and goal to lose weight.         Outpatient Encounter Medications as of 3/25/2024   Medication Sig Dispense Refill    famotidine (PEPCID) 40 MG tablet Take 1 tablet by mouth every evening 93 tablet 1    levothyroxine (SYNTHROID) 50 MCG tablet Take 1 tablet by mouth daily 90 tablet 0    OXcarbazepine (TRILEPTAL) 600 MG tablet Take 1 tablet by mouth      OLANZapine (ZYPREXA) 10 MG tablet Take 1 tablet by mouth nightly      denosumab (PROLIA) 60 MG/ML SOSY SC injection Inject 1 mL into the skin once for 1 dose 1 each 2    ondansetron (ZOFRAN) 4 MG tablet Take 1 tablet by mouth every 8 hours as needed for

## 2024-03-26 DIAGNOSIS — M81.0 OSTEOPOROSIS, UNSPECIFIED OSTEOPOROSIS TYPE, UNSPECIFIED PATHOLOGICAL FRACTURE PRESENCE: Primary | ICD-10-CM

## 2024-03-26 DIAGNOSIS — Z12.31 ENCOUNTER FOR SCREENING MAMMOGRAM FOR MALIGNANT NEOPLASM OF BREAST: ICD-10-CM

## 2024-04-05 ENCOUNTER — TELEPHONE (OUTPATIENT)
Dept: PRIMARY CARE CLINIC | Age: 69
End: 2024-04-05

## 2024-04-19 ENCOUNTER — HOSPITAL ENCOUNTER (OUTPATIENT)
Dept: MAMMOGRAPHY | Age: 69
End: 2024-04-19
Attending: INTERNAL MEDICINE
Payer: MEDICARE

## 2024-04-19 VITALS — BODY MASS INDEX: 38.48 KG/M2 | WEIGHT: 196 LBS | HEIGHT: 60 IN

## 2024-04-19 DIAGNOSIS — M81.0 OSTEOPOROSIS, UNSPECIFIED OSTEOPOROSIS TYPE, UNSPECIFIED PATHOLOGICAL FRACTURE PRESENCE: ICD-10-CM

## 2024-04-19 DIAGNOSIS — Z12.31 ENCOUNTER FOR SCREENING MAMMOGRAM FOR MALIGNANT NEOPLASM OF BREAST: ICD-10-CM

## 2024-04-19 PROCEDURE — 77080 DXA BONE DENSITY AXIAL: CPT

## 2024-04-19 PROCEDURE — 77063 BREAST TOMOSYNTHESIS BI: CPT

## 2024-04-23 ENCOUNTER — TELEPHONE (OUTPATIENT)
Dept: GENERAL RADIOLOGY | Age: 69
End: 2024-04-23

## 2024-04-23 DIAGNOSIS — R92.8 ABNORMAL MAMMOGRAM: Primary | ICD-10-CM

## 2024-04-23 NOTE — TELEPHONE ENCOUNTER
Call to patient in reference to her mammogram performed at [place] on April 19, 2024. I spoke with Jen who advises that she is patient's medical coordinator at [employer]. Patient is diagnosed with an \"Intellectual Disability\" and is a resident [facility]. Per Jen, patient does make her own medical decisions. Jen confirms that patient does not have a POA and that patient's brother Chino Mckeon is only listed as her payee with [employer]. Jen did fax me [employer]'s Informed Consent for Services and Treatment form which was the only form that Jen knew about in regards to HIPAA. This form was scanned into patient's chart.     Instructed Jen that the radiologist has recommended some additional breast imaging for patient in order to make a final determination/result. Informed Jen that a Rx has been obtained by the ordering physician. Next, a  from [facility] will contact Jen  to schedule the additional imaging study/studies. Understanding verbalized.

## 2024-05-08 ENCOUNTER — HOSPITAL ENCOUNTER (OUTPATIENT)
Dept: GENERAL RADIOLOGY | Age: 69
Discharge: HOME | End: 2024-05-10
Attending: INTERNAL MEDICINE
Payer: MEDICARE

## 2024-05-08 DIAGNOSIS — R92.8 ABNORMAL MAMMOGRAM: ICD-10-CM

## 2024-05-08 PROCEDURE — 76642 ULTRASOUND BREAST LIMITED: CPT

## 2024-05-08 PROCEDURE — G0279 TOMOSYNTHESIS, MAMMO: HCPCS

## 2024-06-09 ENCOUNTER — HOSPITAL ENCOUNTER (EMERGENCY)
Age: 69
Discharge: HOME | End: 2024-06-09
Attending: EMERGENCY MEDICINE
Payer: MEDICARE

## 2024-06-09 VITALS
TEMPERATURE: 98.1 F | DIASTOLIC BLOOD PRESSURE: 66 MMHG | RESPIRATION RATE: 18 BRPM | SYSTOLIC BLOOD PRESSURE: 132 MMHG | OXYGEN SATURATION: 95 % | HEART RATE: 66 BPM

## 2024-06-09 DIAGNOSIS — H10.89 OTHER CONJUNCTIVITIS OF LEFT EYE: Primary | ICD-10-CM

## 2024-06-09 PROCEDURE — 99283 EMERGENCY DEPT VISIT LOW MDM: CPT

## 2024-06-09 RX ORDER — ERYTHROMYCIN 5 MG/G
OINTMENT OPHTHALMIC
Qty: 1 EACH | Refills: 0 | Status: SHIPPED | OUTPATIENT
Start: 2024-06-09 | End: 2024-06-19

## 2024-06-09 ASSESSMENT — PAIN DESCRIPTION - PAIN TYPE: TYPE: ACUTE PAIN

## 2024-06-09 ASSESSMENT — PAIN SCALES - GENERAL: PAINLEVEL_OUTOF10: 2

## 2024-06-09 ASSESSMENT — PAIN DESCRIPTION - ONSET: ONSET: ON-GOING

## 2024-06-09 ASSESSMENT — PAIN DESCRIPTION - DESCRIPTORS: DESCRIPTORS: ACHING;SORE;TENDER

## 2024-06-09 ASSESSMENT — PAIN DESCRIPTION - LOCATION: LOCATION: EYE

## 2024-06-09 ASSESSMENT — PAIN DESCRIPTION - ORIENTATION: ORIENTATION: LEFT

## 2024-06-09 ASSESSMENT — PAIN - FUNCTIONAL ASSESSMENT: PAIN_FUNCTIONAL_ASSESSMENT: 0-10

## 2024-06-09 ASSESSMENT — PAIN DESCRIPTION - FREQUENCY: FREQUENCY: CONTINUOUS

## 2024-06-09 NOTE — ED PROVIDER NOTES
HPI:  6/9/24, Time: 10:53 AM EDT         Nathalie Mckeon is a 68 y.o. female presenting to the ED for concern for conjunctivitis.  Patient has a history of intellectual disability so she was unable to provide any meaningful history.  Caregiver is at bedside providing history.  States that upon awakening this morning she noticed some drainage from her left eye.  Patient does not wear glasses or contact lenses.  Patient is not reporting any change to her vision.  She has had no fevers and is otherwise acting normally.    --------------------------------------------- PAST HISTORY ---------------------------------------------  Past Medical History:  has a past medical history of Anemia, Cervical spondylosis, Chronic post-traumatic headache, Clavicle fracture, Dizziness and giddiness, DJD (degenerative joint disease), Helicobacter pylori infection, Mental disability, Muscle contraction headache, Osteopenia, Pneumonia, Post-concussion headache, Thyroid disease, and Varicose veins.    Past Surgical History:  has a past surgical history that includes Tonsillectomy; cyst removal; Heel spur surgery; Colonoscopy (2015); Endoscopy, colon, diagnostic; and Thyroidectomy, Completion.    Social History:  reports that she has never smoked. She has never used smokeless tobacco. She reports that she does not currently use alcohol. She reports that she does not use drugs.    Family History: family history includes Diabetes in her mother; Heart Failure in her mother; Kidney Disease in her mother; Stroke in her mother.     The patient’s home medications have been reviewed.    Allergies: Hydromorphone and Remicade [infliximab]    -------------------------------------------------- RESULTS -------------------------------------------------  All laboratory and radiology results have been personally reviewed by myself   LABS:  No results found for this visit on 06/09/24.    RADIOLOGY:  Interpreted by Radiologist.  No orders to display

## 2024-06-12 ENCOUNTER — HOSPITAL ENCOUNTER (OUTPATIENT)
Dept: NON INVASIVE DIAGNOSTICS | Age: 69
Discharge: HOME | End: 2024-06-12
Payer: MEDICARE

## 2024-06-12 ENCOUNTER — HOSPITAL ENCOUNTER (OUTPATIENT)
Age: 69
Discharge: HOME | End: 2024-06-12
Payer: MEDICARE

## 2024-06-12 ENCOUNTER — TRANSCRIBE ORDERS (OUTPATIENT)
Dept: NON INVASIVE DIAGNOSTICS | Age: 69
End: 2024-06-12

## 2024-06-12 DIAGNOSIS — Z51.81 THERAPEUTIC DRUG MONITORING: ICD-10-CM

## 2024-06-12 DIAGNOSIS — Z51.81 THERAPEUTIC DRUG MONITORING: Primary | ICD-10-CM

## 2024-06-12 LAB
ALBUMIN SERPL-MCNC: 4.5 G/DL (ref 3.5–5.2)
ALP SERPL-CCNC: 249 U/L (ref 35–104)
ALT SERPL-CCNC: 21 U/L (ref 0–32)
ANION GAP SERPL CALCULATED.3IONS-SCNC: 12 MMOL/L (ref 7–16)
AST SERPL-CCNC: 25 U/L (ref 0–31)
BASOPHILS # BLD: 0.04 K/UL (ref 0–0.2)
BASOPHILS NFR BLD: 1 % (ref 0–2)
BILIRUB SERPL-MCNC: 0.2 MG/DL (ref 0–1.2)
BUN SERPL-MCNC: 18 MG/DL (ref 6–23)
CALCIUM SERPL-MCNC: 9.5 MG/DL (ref 8.6–10.2)
CHLORIDE SERPL-SCNC: 104 MMOL/L (ref 98–107)
CO2 SERPL-SCNC: 24 MMOL/L (ref 22–29)
CREAT SERPL-MCNC: 0.8 MG/DL (ref 0.5–1)
EOSINOPHIL # BLD: 0.08 K/UL (ref 0.05–0.5)
EOSINOPHILS RELATIVE PERCENT: 1 % (ref 0–6)
ERYTHROCYTE [DISTWIDTH] IN BLOOD BY AUTOMATED COUNT: 12.5 % (ref 11.5–15)
GFR, ESTIMATED: 79 ML/MIN/1.73M2
GLUCOSE SERPL-MCNC: 87 MG/DL (ref 74–99)
HGB BLD-MCNC: 14.6 G/DL (ref 11.5–15.5)
IMM GRANULOCYTES # BLD AUTO: <0.03 K/UL (ref 0–0.58)
IMM GRANULOCYTES NFR BLD: 1 % (ref 0–5)
LYMPHOCYTES NFR BLD: 1.83 K/UL (ref 1.5–4)
LYMPHOCYTES RELATIVE PERCENT: 30 % (ref 20–42)
MCH RBC QN AUTO: 30.6 PG (ref 26–35)
MCHC RBC AUTO-ENTMCNC: 32.2 G/DL (ref 32–34.5)
MCV RBC AUTO: 95.2 FL (ref 80–99.9)
MONOCYTES NFR BLD: 0.58 K/UL (ref 0.1–0.95)
MONOCYTES NFR BLD: 9 % (ref 2–12)
NEUTROPHILS NFR BLD: 59 % (ref 43–80)
NEUTS SEG NFR BLD: 3.69 K/UL (ref 1.8–7.3)
PLATELET CONFIRMATION: NORMAL
PLATELET, FLUORESCENCE: 236 K/UL (ref 130–450)
PMV BLD AUTO: 9.6 FL (ref 7–12)
POTASSIUM SERPL-SCNC: 4.3 MMOL/L (ref 3.5–5)
PROT SERPL-MCNC: 7.7 G/DL (ref 6.4–8.3)
RBC # BLD AUTO: 4.77 M/UL (ref 3.5–5.5)
SODIUM SERPL-SCNC: 140 MMOL/L (ref 132–146)
TSH SERPL DL<=0.05 MIU/L-ACNC: 3.32 UIU/ML (ref 0.27–4.2)
WBC OTHER # BLD: 6.2 K/UL (ref 4.5–11.5)

## 2024-06-12 PROCEDURE — 85025 COMPLETE CBC W/AUTO DIFF WBC: CPT

## 2024-06-12 PROCEDURE — 84443 ASSAY THYROID STIM HORMONE: CPT

## 2024-06-12 PROCEDURE — 80053 COMPREHEN METABOLIC PANEL: CPT

## 2024-06-12 PROCEDURE — 36415 COLL VENOUS BLD VENIPUNCTURE: CPT

## 2024-06-13 LAB
EKG ATRIAL RATE: 56 BPM
EKG P AXIS: 51 DEGREES
EKG P-R INTERVAL: 150 MS
EKG Q-T INTERVAL: 432 MS
EKG QRS DURATION: 78 MS
EKG QTC CALCULATION (BAZETT): 416 MS
EKG R AXIS: 19 DEGREES
EKG T AXIS: 82 DEGREES
EKG VENTRICULAR RATE: 56 BPM

## 2024-06-18 ENCOUNTER — OFFICE VISIT (OUTPATIENT)
Dept: PRIMARY CARE CLINIC | Age: 69
End: 2024-06-18

## 2024-06-18 VITALS
RESPIRATION RATE: 16 BRPM | HEIGHT: 63 IN | TEMPERATURE: 97 F | SYSTOLIC BLOOD PRESSURE: 120 MMHG | OXYGEN SATURATION: 97 % | HEART RATE: 96 BPM | WEIGHT: 202 LBS | BODY MASS INDEX: 35.79 KG/M2 | DIASTOLIC BLOOD PRESSURE: 70 MMHG

## 2024-06-18 DIAGNOSIS — H10.89 OTHER CONJUNCTIVITIS OF LEFT EYE: Primary | ICD-10-CM

## 2024-06-18 SDOH — ECONOMIC STABILITY: INCOME INSECURITY: HOW HARD IS IT FOR YOU TO PAY FOR THE VERY BASICS LIKE FOOD, HOUSING, MEDICAL CARE, AND HEATING?: NOT HARD AT ALL

## 2024-06-18 SDOH — ECONOMIC STABILITY: FOOD INSECURITY: WITHIN THE PAST 12 MONTHS, THE FOOD YOU BOUGHT JUST DIDN'T LAST AND YOU DIDN'T HAVE MONEY TO GET MORE.: NEVER TRUE

## 2024-06-18 SDOH — ECONOMIC STABILITY: FOOD INSECURITY: WITHIN THE PAST 12 MONTHS, YOU WORRIED THAT YOUR FOOD WOULD RUN OUT BEFORE YOU GOT MONEY TO BUY MORE.: NEVER TRUE

## 2024-06-18 ASSESSMENT — ENCOUNTER SYMPTOMS
VOMITING: 0
NAUSEA: 0
SHORTNESS OF BREATH: 0
ABDOMINAL PAIN: 0
COUGH: 0
DIARRHEA: 0

## 2024-06-18 NOTE — PROGRESS NOTES
FLUoxetine (PROZAC) 20 MG capsule Take 1 capsule by mouth daily      acetaminophen (TYLENOL) 325 MG tablet Take 2 tablets by mouth every 6 hours as needed for Pain      SORE THROAT 1.4 % LIQD mouth spray       neomycin-bacitracin-polymyxin (NEOSPORIN) 3.5-400-5000 OINT ointment       COUGH/CHEST CONGESTION DM  MG/5ML SYRP       TUSSIN -10 MG/5ML syrup       bismuth subsalicylate (PEPTO BISMOL) 262 MG/15ML suspension Take 15 mLs by mouth every 6 hours as needed for Indigestion      medicated lip balm (BLISTEX/CARMEX) 2-2.5-6.6 % STCK Apply topically as needed for Dry Lips      Ibuprofen (ADVIL PO) Take by mouth      magnesium hydroxide (MILK OF MAGNESIA CONCENTRATE) 2400 MG/10ML SUSP Take 10 mLs by mouth once as needed      ketoconazole (NIZORAL) 2 % shampoo Apply topically daily Apply topically daily as needed.      Multiple Vitamins-Minerals (THERAPEUTIC MULTIVITAMIN-MINERALS) tablet Take 1 tablet by mouth daily      Elastic Bandages & Supports (JOBST KNEE HIGH COMPRESSION SM) MISC Compression stockings 20-30 mm hg thigh high bilaterally  Dx : Venous Insufficiency, Swelling  Please provide scooby 2 each 2    denosumab (PROLIA) 60 MG/ML SOSY SC injection Inject 1 mL into the skin once for 1 dose 1 each 2     Facility-Administered Encounter Medications as of 6/18/2024   Medication Dose Route Frequency Provider Last Rate Last Admin    denosumab (PROLIA) SC injection 60 mg  60 mg SubCUTAneous Once Fabio Valles MD           No follow-ups on file.        Reviewed recent labs related to Nathalie's current problems      Discussed importance of regular Health Maintenance follow up  Health Maintenance   Topic    Hepatitis C screen     Respiratory Syncytial Virus (RSV) Pregnant or age 60 yrs+ (1 - 1-dose 60+ series)    Shingles vaccine (2 of 2)    Pneumococcal 65+ years Vaccine (2 of 2 - PCV)    COVID-19 Vaccine (4 - 2023-24 season)    Annual Wellness Visit (Medicare)     Depression Screen     Colorectal

## 2024-07-08 ENCOUNTER — HOSPITAL ENCOUNTER (EMERGENCY)
Age: 69
Discharge: HOME | End: 2024-07-08
Payer: MEDICARE

## 2024-07-08 VITALS
SYSTOLIC BLOOD PRESSURE: 158 MMHG | HEART RATE: 90 BPM | TEMPERATURE: 98.7 F | BODY MASS INDEX: 35.92 KG/M2 | WEIGHT: 202.8 LBS | OXYGEN SATURATION: 96 % | DIASTOLIC BLOOD PRESSURE: 60 MMHG | RESPIRATION RATE: 16 BRPM

## 2024-07-08 DIAGNOSIS — H10.9 CONJUNCTIVITIS OF LEFT EYE, UNSPECIFIED CONJUNCTIVITIS TYPE: Primary | ICD-10-CM

## 2024-07-08 PROCEDURE — 6370000000 HC RX 637 (ALT 250 FOR IP): Performed by: NURSE PRACTITIONER

## 2024-07-08 PROCEDURE — 6360000002 HC RX W HCPCS: Performed by: NURSE PRACTITIONER

## 2024-07-08 PROCEDURE — 90714 TD VACC NO PRESV 7 YRS+ IM: CPT | Performed by: NURSE PRACTITIONER

## 2024-07-08 PROCEDURE — 99283 EMERGENCY DEPT VISIT LOW MDM: CPT

## 2024-07-08 PROCEDURE — 90471 IMMUNIZATION ADMIN: CPT | Performed by: NURSE PRACTITIONER

## 2024-07-08 RX ORDER — ERYTHROMYCIN 5 MG/G
OINTMENT OPHTHALMIC EVERY 6 HOURS
Qty: 1 EACH | Refills: 0 | Status: SHIPPED | OUTPATIENT
Start: 2024-07-08 | End: 2024-07-15

## 2024-07-08 RX ORDER — TETANUS AND DIPHTHERIA TOXOIDS ADSORBED 2; 2 [LF]/.5ML; [LF]/.5ML
0.5 INJECTION INTRAMUSCULAR ONCE
Status: COMPLETED | OUTPATIENT
Start: 2024-07-08 | End: 2024-07-08

## 2024-07-08 RX ORDER — TETRACAINE HYDROCHLORIDE 5 MG/ML
1 SOLUTION OPHTHALMIC ONCE
Status: COMPLETED | OUTPATIENT
Start: 2024-07-08 | End: 2024-07-08

## 2024-07-08 RX ADMIN — TETANUS AND DIPHTHERIA TOXOIDS ADSORBED 0.5 ML: 2; 2 INJECTION INTRAMUSCULAR at 20:18

## 2024-07-08 RX ADMIN — FLUORESCEIN SODIUM 1 MG: 1 STRIP OPHTHALMIC at 20:17

## 2024-07-08 RX ADMIN — TETRACAINE HYDROCHLORIDE 1 DROP: 5 SOLUTION OPHTHALMIC at 20:18

## 2024-07-08 ASSESSMENT — VISUAL ACUITY
OD: 20/50
OU: 20/50
OS: 20/50

## 2024-07-08 ASSESSMENT — PAIN - FUNCTIONAL ASSESSMENT: PAIN_FUNCTIONAL_ASSESSMENT: NONE - DENIES PAIN

## 2024-07-09 NOTE — ED PROVIDER NOTES
of Care/Counseling:  MATT Saunders CNP reviewed today's visit with the patient and group home personnel in addition to providing specific details for the plan of care and counseling regarding the diagnosis and prognosis.  Questions are answered at this time and are agreeable with the plan.    Assessment      1. Conjunctivitis of left eye, unspecified conjunctivitis type      Plan   Discharged home.  Patient condition is stable    New Medications     Discharge Medication List as of 7/8/2024  9:36 PM        START taking these medications    Details   erythromycin (ROMYCIN) 5 MG/GM ophthalmic ointment Place into the left eye every 6 hours for 7 days, Left Eye, EVERY 6 HOURS Starting Mon 7/8/2024, Until Mon 7/15/2024, For 7 days, Disp-1 each, R-0, Normal           Electronically signed by MATT Saunders CNP   DD: 7/8/24  **This report was transcribed using voice recognition software. Every effort was made to ensure accuracy; however, inadvertent computerized transcription errors may be present.  END OF ED PROVIDER NOTE       Mode Posada APRN - CNP  07/08/24 6654    
impairments found/functional limitations in following categories

## 2024-07-10 ENCOUNTER — OFFICE VISIT (OUTPATIENT)
Dept: PRIMARY CARE CLINIC | Age: 69
End: 2024-07-10

## 2024-07-10 VITALS
OXYGEN SATURATION: 97 % | TEMPERATURE: 97.6 F | HEART RATE: 60 BPM | HEIGHT: 63 IN | DIASTOLIC BLOOD PRESSURE: 70 MMHG | WEIGHT: 202 LBS | RESPIRATION RATE: 16 BRPM | BODY MASS INDEX: 35.79 KG/M2 | SYSTOLIC BLOOD PRESSURE: 118 MMHG

## 2024-07-10 DIAGNOSIS — Z00.00 MEDICARE ANNUAL WELLNESS VISIT, SUBSEQUENT: Primary | ICD-10-CM

## 2024-07-10 ASSESSMENT — PATIENT HEALTH QUESTIONNAIRE - PHQ9
2. FEELING DOWN, DEPRESSED OR HOPELESS: NOT AT ALL
SUM OF ALL RESPONSES TO PHQ9 QUESTIONS 1 & 2: 0
SUM OF ALL RESPONSES TO PHQ QUESTIONS 1-9: 0
1. LITTLE INTEREST OR PLEASURE IN DOING THINGS: NOT AT ALL
SUM OF ALL RESPONSES TO PHQ QUESTIONS 1-9: 0

## 2024-07-10 NOTE — PROGRESS NOTES
Medicare Annual Wellness Visit    Nathalie Mckeon is here for Medicare AWV (Patient is here for an AWV )    Assessment & Plan   Medicare annual wellness visit, subsequent  Recommendations for Preventive Services Due: see orders and patient instructions/AVS.  Recommended screening schedule for the next 5-10 years is provided to the patient in written form: see Patient Instructions/AVS.     Return for Medicare Annual Wellness Visit in 1 year.     Subjective       Patient's complete Health Risk Assessment and screening values have been reviewed and are found in Flowsheets. The following problems were reviewed today and where indicated follow up appointments were made and/or referrals ordered.    Positive Risk Factor Screenings with Interventions:    Fall Risk:  Do you feel unsteady or are you worried about falling? : (!) yes  2 or more falls in past year?: no  Fall with injury in past year?: no     Interventions:    Reviewed medications, home hazards, visual acuity, and co-morbidities that can increase risk for falls             Inactivity:  On average, how many days per week do you engage in moderate to strenuous exercise (like a brisk walk)?: 0 days (!) Abnormal  On average, how many minutes do you engage in exercise at this level?: 0 min  Interventions - Inactivity:  Recommendations: to increse activity    Abnormal BMI (obese):  Body mass index is 35.78 kg/m². (!) Abnormal  Interventions - Obesity:  Advised patient to watch diet, increase activity, and goal to lose weight.                            Objective   Vitals:    07/10/24 1015   BP: 118/70   Pulse: 60   Resp: 16   Temp: 97.6 °F (36.4 °C)   SpO2: 97%   Weight: 91.6 kg (202 lb)   Height: 1.6 m (5' 3\")      Body mass index is 35.78 kg/m².                    Allergies   Allergen Reactions    Hydromorphone     Remicade [Infliximab]      Prior to Visit Medications    Medication Sig Taking? Authorizing Provider   erythromycin (ROMYCIN) 5 MG/GM ophthalmic

## 2024-10-11 ENCOUNTER — OFFICE VISIT (OUTPATIENT)
Dept: PRIMARY CARE CLINIC | Age: 69
End: 2024-10-11

## 2024-10-11 VITALS
RESPIRATION RATE: 16 BRPM | HEIGHT: 63 IN | BODY MASS INDEX: 37.39 KG/M2 | TEMPERATURE: 97 F | DIASTOLIC BLOOD PRESSURE: 72 MMHG | OXYGEN SATURATION: 98 % | WEIGHT: 211 LBS | SYSTOLIC BLOOD PRESSURE: 110 MMHG | HEART RATE: 63 BPM

## 2024-10-11 DIAGNOSIS — E66.09 CLASS 1 OBESITY DUE TO EXCESS CALORIES WITHOUT SERIOUS COMORBIDITY WITH BODY MASS INDEX (BMI) OF 33.0 TO 33.9 IN ADULT: ICD-10-CM

## 2024-10-11 DIAGNOSIS — F79 INTELLECTUAL DISABILITY: ICD-10-CM

## 2024-10-11 DIAGNOSIS — F39 MOOD DISORDER (HCC): Primary | ICD-10-CM

## 2024-10-11 DIAGNOSIS — E66.811 CLASS 1 OBESITY DUE TO EXCESS CALORIES WITHOUT SERIOUS COMORBIDITY WITH BODY MASS INDEX (BMI) OF 33.0 TO 33.9 IN ADULT: ICD-10-CM

## 2024-10-11 ASSESSMENT — ENCOUNTER SYMPTOMS
NAUSEA: 0
DIARRHEA: 0
VOMITING: 0
COUGH: 0
ABDOMINAL PAIN: 0
SHORTNESS OF BREATH: 0

## 2024-10-11 NOTE — PROGRESS NOTES
PCV)    Flu vaccine (1)    COVID-19 Vaccine (4 - 2023-24 season)    Depression Screen     Annual Wellness Visit (Medicare)     Colorectal Cancer Screen     Breast cancer screen     Lipids     DTaP/Tdap/Td vaccine (3 - Td or Tdap)    DEXA (modify frequency per FRAX score)     Hepatitis A vaccine     Hepatitis B vaccine     Hib vaccine     Polio vaccine     Meningococcal (ACWY) vaccine     Pneumococcal 0-64 years Vaccine     Diabetes screen

## 2024-10-28 ENCOUNTER — HOSPITAL ENCOUNTER (EMERGENCY)
Age: 69
Discharge: HOME | End: 2024-10-28
Attending: EMERGENCY MEDICINE
Payer: MEDICARE

## 2024-10-28 VITALS
SYSTOLIC BLOOD PRESSURE: 174 MMHG | OXYGEN SATURATION: 95 % | DIASTOLIC BLOOD PRESSURE: 68 MMHG | TEMPERATURE: 98.1 F | RESPIRATION RATE: 14 BRPM | HEART RATE: 66 BPM

## 2024-10-28 DIAGNOSIS — R35.0 URINARY FREQUENCY: Primary | ICD-10-CM

## 2024-10-28 LAB
BILIRUB UR QL STRIP: NEGATIVE
CLARITY UR: CLEAR
COLOR UR: YELLOW
CRYSTALS URNS MICRO: ABNORMAL /HPF
EPI CELLS #/AREA URNS HPF: ABNORMAL /HPF
GLUCOSE UR STRIP-MCNC: NEGATIVE MG/DL
HGB UR QL STRIP.AUTO: NEGATIVE
KETONES UR STRIP-MCNC: NEGATIVE MG/DL
LEUKOCYTE ESTERASE UR QL STRIP: NEGATIVE
NITRITE UR QL STRIP: NEGATIVE
PH UR STRIP: 5.5 [PH] (ref 5–9)
PROT UR STRIP-MCNC: NEGATIVE MG/DL
RBC #/AREA URNS HPF: ABNORMAL /HPF
SARS-COV-2 RDRP RESP QL NAA+PROBE: NOT DETECTED
SP GR UR STRIP: >1.03 (ref 1–1.03)
SPECIMEN DESCRIPTION: NORMAL
UROBILINOGEN UR STRIP-ACNC: 0.2 EU/DL (ref 0–1)
WBC #/AREA URNS HPF: ABNORMAL /HPF

## 2024-10-28 PROCEDURE — 81001 URINALYSIS AUTO W/SCOPE: CPT

## 2024-10-28 PROCEDURE — 99283 EMERGENCY DEPT VISIT LOW MDM: CPT

## 2024-10-28 PROCEDURE — 87635 SARS-COV-2 COVID-19 AMP PRB: CPT

## 2024-10-28 PROCEDURE — 87086 URINE CULTURE/COLONY COUNT: CPT

## 2024-10-28 NOTE — ED PROVIDER NOTES
documented in the ED course or MDM, non-plain film images such as CT, Ultrasound and MRI are read by the radiologist unless otherwise specified in the medical decision-making.  Plain radiographic images are preliminarily interpreted by this ED Provider with the findings documented in the ED Course with official radiology read to follow.    Interpretation per the Radiologist below, if available at the time of this note:    No orders to display     No results found.    No results found.      PAST MEDICAL HISTORY/Chronic Conditions Affecting Care      has a past medical history of Anemia, Cervical spondylosis (10/27/2022), Chronic post-traumatic headache (10/27/2022), Clavicle fracture, Dizziness and giddiness (10/27/2022), DJD (degenerative joint disease), Helicobacter pylori infection (12/2015), Mental disability, Muscle contraction headache (10/27/2022), Osteopenia, Pneumonia, Post-concussion headache (10/27/2022), Thyroid disease, and Varicose veins.     EMERGENCY DEPARTMENT COURSE    Vitals:    Vitals:    10/28/24 1115   BP: (!) 174/68   Pulse: 66   Resp: 14   Temp: 98.1 °F (36.7 °C)   TempSrc: Oral   SpO2: 95%       Patient was given the following medications:  Medications - No data to display      Is this patient to be included in the SEP-1 Core Measure due to severe sepsis or septic shock?   No   Exclusion criteria - the patient is NOT to be included for SEP-1 Core Measure due to:  2+ SIRS criteria are not met          Medical Decision Making/Differential Diagnosis:    CC/HPI Summary, Social Determinants of health, Records Reviewed, DDx, testing done/not done, ED Course, Reassessment, disposition considerations/shared decision making with patient, consults, disposition:                 Medical Decision Making  Amount and/or Complexity of Data Reviewed  Labs: ordered.        69-year-old female history of intellectual disability brought in by caretaker from group home for concern of UTI.  History was limited

## 2024-10-29 LAB
MICROORGANISM SPEC CULT: ABNORMAL
SERVICE CMNT-IMP: ABNORMAL
SPECIMEN DESCRIPTION: ABNORMAL

## 2025-01-15 DIAGNOSIS — M81.0 OSTEOPOROSIS, UNSPECIFIED OSTEOPOROSIS TYPE, UNSPECIFIED PATHOLOGICAL FRACTURE PRESENCE: ICD-10-CM

## 2025-01-17 RX ORDER — CHLORAL HYDRATE 500 MG
1000 CAPSULE ORAL 3 TIMES DAILY
Qty: 90 CAPSULE | Refills: 0 | Status: SHIPPED | OUTPATIENT
Start: 2025-01-17

## 2025-01-17 RX ORDER — M-VIT,TX,IRON,MINS/CALC/FOLIC 27MG-0.4MG
1 TABLET ORAL DAILY
Qty: 90 TABLET | Refills: 0 | Status: SHIPPED | OUTPATIENT
Start: 2025-01-17

## 2025-01-17 RX ORDER — LEVOTHYROXINE SODIUM 50 UG/1
50 TABLET ORAL DAILY
Qty: 90 TABLET | Refills: 0 | Status: SHIPPED | OUTPATIENT
Start: 2025-01-17

## 2025-01-17 RX ORDER — ACETAMINOPHEN 160 MG
2000 TABLET,DISINTEGRATING ORAL DAILY
Qty: 90 CAPSULE | Refills: 0 | Status: SHIPPED | OUTPATIENT
Start: 2025-01-17

## 2025-01-17 RX ORDER — FAMOTIDINE 40 MG/1
40 TABLET, FILM COATED ORAL EVERY EVENING
Qty: 90 TABLET | Refills: 0 | Status: SHIPPED | OUTPATIENT
Start: 2025-01-17

## 2025-02-14 DIAGNOSIS — M81.0 OSTEOPOROSIS, UNSPECIFIED OSTEOPOROSIS TYPE, UNSPECIFIED PATHOLOGICAL FRACTURE PRESENCE: ICD-10-CM

## 2025-02-14 RX ORDER — ACETAMINOPHEN 160 MG
2000 TABLET,DISINTEGRATING ORAL DAILY
Qty: 90 CAPSULE | Refills: 0 | Status: SHIPPED | OUTPATIENT
Start: 2025-02-14

## 2025-02-14 RX ORDER — CHLORAL HYDRATE 500 MG
1000 CAPSULE ORAL 3 TIMES DAILY
Qty: 90 CAPSULE | Refills: 0 | Status: SHIPPED | OUTPATIENT
Start: 2025-02-14

## 2025-02-14 RX ORDER — FAMOTIDINE 40 MG/1
40 TABLET, FILM COATED ORAL EVERY EVENING
Qty: 90 TABLET | Refills: 0 | Status: SHIPPED | OUTPATIENT
Start: 2025-02-14

## 2025-02-14 RX ORDER — M-VIT,TX,IRON,MINS/CALC/FOLIC 27MG-0.4MG
1 TABLET ORAL DAILY
Qty: 90 TABLET | Refills: 0 | Status: SHIPPED | OUTPATIENT
Start: 2025-02-14

## 2025-02-14 RX ORDER — LEVOTHYROXINE SODIUM 50 UG/1
50 TABLET ORAL DAILY
Qty: 90 TABLET | Refills: 0 | Status: SHIPPED | OUTPATIENT
Start: 2025-02-14

## 2025-02-14 NOTE — TELEPHONE ENCOUNTER
Name of Medication(s) Requested:  Requested Prescriptions     Pending Prescriptions Disp Refills    vitamin D (VITAMIN D3) 50 MCG (2000 UT) CAPS capsule 90 capsule 0     Sig: Take 1 capsule by mouth daily    Omega-3 Fatty Acids (FISH OIL) 1000 MG capsule 90 capsule 0     Sig: Take 1 capsule by mouth 3 times daily    levothyroxine (SYNTHROID) 50 MCG tablet 90 tablet 0     Sig: Take 1 tablet by mouth daily    famotidine (PEPCID) 40 MG tablet 90 tablet 0     Sig: Take 1 tablet by mouth every evening    Multiple Vitamins-Minerals (THERAPEUTIC MULTIVITAMIN-MINERALS) tablet 90 tablet 0     Sig: Take 1 tablet by mouth daily       Medication is on current medication list Yes    Dosage and directions were verified? Yes    Quantity verified: 90 day supply     Pharmacy Verified?  Yes    Last Appointment:  10/11/2024    Future appts:  Future Appointments   Date Time Provider Department Center   4/11/2025 10:00 AM Fabio Valles MD [facility] DEP   7/14/2025 11:00 AM Fabio Valles MD [facility] DEP        (If no appt send self scheduling link. .REFILLAPPT)  Scheduling request sent?     [] Yes  [x] No    Does patient need updated?  [] Yes  [x] No

## 2025-04-11 ENCOUNTER — OFFICE VISIT (OUTPATIENT)
Dept: PRIMARY CARE CLINIC | Age: 70
End: 2025-04-11

## 2025-04-11 VITALS
TEMPERATURE: 97 F | HEART RATE: 63 BPM | SYSTOLIC BLOOD PRESSURE: 118 MMHG | BODY MASS INDEX: 37.03 KG/M2 | RESPIRATION RATE: 16 BRPM | WEIGHT: 209 LBS | DIASTOLIC BLOOD PRESSURE: 70 MMHG | HEIGHT: 63 IN | OXYGEN SATURATION: 98 %

## 2025-04-11 DIAGNOSIS — E66.09 CLASS 1 OBESITY DUE TO EXCESS CALORIES WITHOUT SERIOUS COMORBIDITY WITH BODY MASS INDEX (BMI) OF 33.0 TO 33.9 IN ADULT: ICD-10-CM

## 2025-04-11 DIAGNOSIS — E66.811 CLASS 1 OBESITY DUE TO EXCESS CALORIES WITHOUT SERIOUS COMORBIDITY WITH BODY MASS INDEX (BMI) OF 33.0 TO 33.9 IN ADULT: ICD-10-CM

## 2025-04-11 DIAGNOSIS — F39 MOOD DISORDER: ICD-10-CM

## 2025-04-11 DIAGNOSIS — F79 INTELLECTUAL DISABILITY: Primary | ICD-10-CM

## 2025-04-11 SDOH — ECONOMIC STABILITY: FOOD INSECURITY: WITHIN THE PAST 12 MONTHS, YOU WORRIED THAT YOUR FOOD WOULD RUN OUT BEFORE YOU GOT MONEY TO BUY MORE.: NEVER TRUE

## 2025-04-11 SDOH — ECONOMIC STABILITY: FOOD INSECURITY: WITHIN THE PAST 12 MONTHS, THE FOOD YOU BOUGHT JUST DIDN'T LAST AND YOU DIDN'T HAVE MONEY TO GET MORE.: NEVER TRUE

## 2025-04-11 ASSESSMENT — PATIENT HEALTH QUESTIONNAIRE - PHQ9
SUM OF ALL RESPONSES TO PHQ QUESTIONS 1-9: 0
SUM OF ALL RESPONSES TO PHQ QUESTIONS 1-9: 0
1. LITTLE INTEREST OR PLEASURE IN DOING THINGS: NOT AT ALL
2. FEELING DOWN, DEPRESSED OR HOPELESS: NOT AT ALL
SUM OF ALL RESPONSES TO PHQ QUESTIONS 1-9: 0
SUM OF ALL RESPONSES TO PHQ QUESTIONS 1-9: 0

## 2025-04-11 ASSESSMENT — ENCOUNTER SYMPTOMS
SHORTNESS OF BREATH: 0
DIARRHEA: 0
NAUSEA: 0
COUGH: 0
VOMITING: 0
ABDOMINAL PAIN: 0

## 2025-04-11 NOTE — PROGRESS NOTES
ondansetron (ZOFRAN) 4 MG tablet Take 1 tablet by mouth every 8 hours as needed for Nausea 10 tablet 0    D3 50 MCG (2000 UT) TABS       FLUoxetine (PROZAC) 20 MG capsule Take 1 capsule by mouth daily      acetaminophen (TYLENOL) 325 MG tablet Take 2 tablets by mouth every 6 hours as needed for Pain      SORE THROAT 1.4 % LIQD mouth spray       neomycin-bacitracin-polymyxin (NEOSPORIN) 3.5-400-5000 OINT ointment       COUGH/CHEST CONGESTION DM  MG/5ML SYRP       TUSSIN -10 MG/5ML syrup       bismuth subsalicylate (PEPTO BISMOL) 262 MG/15ML suspension Take 15 mLs by mouth every 6 hours as needed for Indigestion      medicated lip balm (BLISTEX/CARMEX) 2-2.5-6.6 % STCK Apply topically as needed for Dry Lips      Ibuprofen (ADVIL PO) Take by mouth      magnesium hydroxide (MILK OF MAGNESIA CONCENTRATE) 2400 MG/10ML SUSP Take 10 mLs by mouth once as needed      ketoconazole (NIZORAL) 2 % shampoo Apply topically daily Apply topically daily as needed.      Elastic Bandages & Supports (JOBST KNEE HIGH COMPRESSION SM) MISC Compression stockings 20-30 mm hg thigh high bilaterally  Dx : Venous Insufficiency, Swelling  Please provide scooby 2 each 2    denosumab (PROLIA) 60 MG/ML SOSY SC injection Inject 1 mL into the skin once for 1 dose 1 each 2     Facility-Administered Encounter Medications as of 4/11/2025   Medication Dose Route Frequency Provider Last Rate Last Admin    denosumab (PROLIA) SC injection 60 mg  60 mg SubCUTAneous Once Fabio Valles MD           Return in about 6 months (around 10/11/2025).        Reviewed recent labs related to Nathalie's current problems      Discussed importance of regular Health Maintenance follow up  Health Maintenance   Topic    Hepatitis C screen     Shingles vaccine (2 of 2)    Pneumococcal 50+ years Vaccine (2 of 2 - PCV)    COVID-19 Vaccine (4 - 2024-25 season)    Depression Screen     Annual Wellness Visit (Medicare)     Flu vaccine (Season Ended)    Colorectal

## 2025-05-16 DIAGNOSIS — M81.0 OSTEOPOROSIS, UNSPECIFIED OSTEOPOROSIS TYPE, UNSPECIFIED PATHOLOGICAL FRACTURE PRESENCE: ICD-10-CM

## 2025-05-16 RX ORDER — CHLORAL HYDRATE 500 MG
1000 CAPSULE ORAL 3 TIMES DAILY
Qty: 90 CAPSULE | Refills: 1 | Status: SHIPPED | OUTPATIENT
Start: 2025-05-16

## 2025-07-16 DIAGNOSIS — M81.0 OSTEOPOROSIS, UNSPECIFIED OSTEOPOROSIS TYPE, UNSPECIFIED PATHOLOGICAL FRACTURE PRESENCE: ICD-10-CM

## 2025-07-16 NOTE — TELEPHONE ENCOUNTER
Name of Medication(s) Requested:  Requested Prescriptions     Pending Prescriptions Disp Refills    famotidine (PEPCID) 40 MG tablet 90 tablet 0     Sig: Take 1 tablet by mouth every evening    levothyroxine (SYNTHROID) 50 MCG tablet 90 tablet 0     Sig: Take 1 tablet by mouth daily    Multiple Vitamins-Minerals (THERAPEUTIC MULTIVITAMIN-MINERALS) tablet 90 tablet 0     Sig: Take 1 tablet by mouth daily    Omega-3 Fatty Acids (FISH OIL) 1000 MG capsule 90 capsule 1     Sig: Take 1 capsule by mouth 3 times daily    vitamin D (VITAMIN D3) 50 MCG (2000 UT) CAPS capsule 90 capsule 0     Sig: Take 1 capsule by mouth daily       Medication is on current medication list Yes    Dosage and directions were verified? Yes    Quantity verified: 90 day supply     Pharmacy Verified?  Yes    Last Appointment:  Visit date not found    Future appts:  Future Appointments   Date Time Provider Department Center   10/29/2025 10:15 AM Gloria Carrion MD [facility] DEP   12/8/2025  9:15 AM Gloria Carrion MD [facility] DEP        (If no appt send self scheduling link. .REFILLAPPT)  Scheduling request sent?     [] Yes  [x] No    Does patient need updated?  [] Yes  [x] No

## 2025-07-16 NOTE — TELEPHONE ENCOUNTER
Destiny from [facility] requesting a refill of the following      Multiple Vitamins-Minerals (THERAPEUTIC MULTIVITAMIN-MINERALS) tablet      vitamin D (VITAMIN D3) 50 MCG (2000 UT) CAPS capsule     levothyroxine (SYNTHROID) 50 MCG tablet     Omega-3 Fatty Acids (FISH OIL) 1000 MG capsule       famotidine (PEPCID) 40 MG tablet    Please send to   [facility] - [facility] [place] - [address] -    Patient is scheduled to see Dr. Carrion

## 2025-07-19 RX ORDER — LEVOTHYROXINE SODIUM 50 UG/1
50 TABLET ORAL DAILY
Qty: 90 TABLET | Refills: 0 | Status: SHIPPED | OUTPATIENT
Start: 2025-07-19

## 2025-07-19 RX ORDER — CHLORAL HYDRATE 500 MG
1000 CAPSULE ORAL 3 TIMES DAILY
Qty: 90 CAPSULE | Refills: 1 | Status: SHIPPED | OUTPATIENT
Start: 2025-07-19

## 2025-07-19 RX ORDER — ACETAMINOPHEN 160 MG
2000 TABLET,DISINTEGRATING ORAL DAILY
Qty: 90 CAPSULE | Refills: 0 | Status: SHIPPED | OUTPATIENT
Start: 2025-07-19

## 2025-07-19 RX ORDER — FAMOTIDINE 40 MG/1
40 TABLET, FILM COATED ORAL EVERY EVENING
Qty: 90 TABLET | Refills: 0 | Status: SHIPPED | OUTPATIENT
Start: 2025-07-19

## 2025-07-19 RX ORDER — M-VIT,TX,IRON,MINS/CALC/FOLIC 27MG-0.4MG
1 TABLET ORAL DAILY
Qty: 90 TABLET | Refills: 0 | Status: SHIPPED | OUTPATIENT
Start: 2025-07-19